# Patient Record
Sex: MALE | Race: WHITE | NOT HISPANIC OR LATINO | Employment: FULL TIME | URBAN - METROPOLITAN AREA
[De-identification: names, ages, dates, MRNs, and addresses within clinical notes are randomized per-mention and may not be internally consistent; named-entity substitution may affect disease eponyms.]

---

## 2017-06-21 ENCOUNTER — HISTORICAL (OUTPATIENT)
Dept: ADMINISTRATIVE | Facility: HOSPITAL | Age: 22
End: 2017-06-21

## 2017-06-27 ENCOUNTER — HISTORICAL (OUTPATIENT)
Dept: ADMINISTRATIVE | Facility: HOSPITAL | Age: 22
End: 2017-06-27

## 2017-06-27 LAB
BUN SERPL-MCNC: 18 MG/DL (ref 7–18)
CALCIUM SERPL-MCNC: 9.7 MG/DL (ref 8.5–10.1)
CHLORIDE SERPL-SCNC: 100 MMOL/L (ref 96–110)
CO2 SERPL-SCNC: 29 MMOL/L (ref 21–32)
CREAT SERPL-MCNC: 0.9 MG/DL (ref 0.6–1.3)
ERYTHROCYTE [DISTWIDTH] IN BLOOD BY AUTOMATED COUNT: 11.9 % (ref 11.5–14.5)
GLUCOSE SERPL-MCNC: 91 MG/DL (ref 74–106)
HCT VFR BLD AUTO: 48.7 % (ref 40–51)
HGB BLD-MCNC: 16.9 GM/DL (ref 13.5–17.5)
MCH RBC QN AUTO: 30.3 PG (ref 26–34)
MCHC RBC AUTO-ENTMCNC: 34.7 GM/DL (ref 31–37)
MCV RBC AUTO: 87.4 FL (ref 80–100)
PLATELET # BLD AUTO: 444 X10(3)/MCL (ref 130–400)
PMV BLD AUTO: 9 FL (ref 7.4–10.4)
POTASSIUM SERPL-SCNC: 3.8 MMOL/L (ref 3.6–5.2)
RBC # BLD AUTO: 5.57 X10(6)/MCL (ref 4.5–5.9)
SODIUM SERPL-SCNC: 140 MMOL/L (ref 135–146)
WBC # SPEC AUTO: 12.1 X10(3)/MCL (ref 4.5–11)

## 2017-06-28 ENCOUNTER — HISTORICAL (OUTPATIENT)
Dept: ADMINISTRATIVE | Facility: HOSPITAL | Age: 22
End: 2017-06-28

## 2017-07-06 ENCOUNTER — HISTORICAL (OUTPATIENT)
Dept: SURGERY | Facility: HOSPITAL | Age: 22
End: 2017-07-06

## 2017-07-19 ENCOUNTER — HISTORICAL (OUTPATIENT)
Dept: ADMINISTRATIVE | Facility: HOSPITAL | Age: 22
End: 2017-07-19

## 2017-08-02 ENCOUNTER — HISTORICAL (OUTPATIENT)
Dept: ADMINISTRATIVE | Facility: HOSPITAL | Age: 22
End: 2017-08-02

## 2017-09-06 ENCOUNTER — HISTORICAL (OUTPATIENT)
Dept: ADMINISTRATIVE | Facility: HOSPITAL | Age: 22
End: 2017-09-06

## 2018-02-08 ENCOUNTER — HISTORICAL (OUTPATIENT)
Dept: ADMINISTRATIVE | Facility: HOSPITAL | Age: 23
End: 2018-02-08

## 2018-03-06 ENCOUNTER — HISTORICAL (OUTPATIENT)
Dept: ADMINISTRATIVE | Facility: HOSPITAL | Age: 23
End: 2018-03-06

## 2018-11-14 ENCOUNTER — HISTORICAL (OUTPATIENT)
Dept: ADMINISTRATIVE | Facility: HOSPITAL | Age: 23
End: 2018-11-14

## 2019-03-26 ENCOUNTER — HISTORICAL (OUTPATIENT)
Dept: ADMINISTRATIVE | Facility: HOSPITAL | Age: 24
End: 2019-03-26

## 2019-08-25 ENCOUNTER — HISTORICAL (OUTPATIENT)
Dept: ADMINISTRATIVE | Facility: HOSPITAL | Age: 24
End: 2019-08-25

## 2019-11-26 ENCOUNTER — HISTORICAL (OUTPATIENT)
Dept: ADMINISTRATIVE | Facility: HOSPITAL | Age: 24
End: 2019-11-26

## 2021-02-10 ENCOUNTER — HISTORICAL (OUTPATIENT)
Dept: ADMINISTRATIVE | Facility: HOSPITAL | Age: 26
End: 2021-02-10

## 2021-06-14 ENCOUNTER — HISTORICAL (OUTPATIENT)
Dept: ADMINISTRATIVE | Facility: HOSPITAL | Age: 26
End: 2021-06-14

## 2021-12-28 ENCOUNTER — HISTORICAL (OUTPATIENT)
Dept: ADMINISTRATIVE | Facility: HOSPITAL | Age: 26
End: 2021-12-28

## 2022-04-11 ENCOUNTER — HISTORICAL (OUTPATIENT)
Dept: ADMINISTRATIVE | Facility: HOSPITAL | Age: 27
End: 2022-04-11

## 2022-04-27 VITALS
HEIGHT: 67 IN | BODY MASS INDEX: 48.44 KG/M2 | DIASTOLIC BLOOD PRESSURE: 103 MMHG | SYSTOLIC BLOOD PRESSURE: 146 MMHG | WEIGHT: 308.63 LBS

## 2022-04-30 NOTE — OP NOTE
DATE OF SURGERY:    07/06/2017    SURGEON:  Bernard Mustafa    ATTENDING PHYSICIAN:  Antonio Liao MD    PROCEDURE PERFORMED:  Open reduction and internal fixation of left bimalleolar ankle fracture.    PREOPERATIVE DIAGNOSIS:  Left bimalleolar ankle fracture.    POSTOPERATIVE DIAGNOSIS:  Left bimalleolar ankle fracture.    INDICATIONS FOR THE PROCEDURE:  This is a 22-year-old male who sustained a twisting injury to his left ankle a few weeks ago.  He had significant swelling and blisters, requiring a brief period of rest and immobilization, roughly 3 weeks, before presenting to the operating room today.  The risks, benefits and alternatives of operative fixation of his left ankle were discussed and he elected to proceed.    PROCEDURE IN DETAIL:  The patient was identified in the preoperative holding area.  The proper site was marked.  Consent was obtained.  He was taken to the operating room and placed supine on the operating table and a nonsterile tourniquet was applied after general endotracheal intubation anesthesia was administered.  Preoperative antibiotics were administered.  The left lower extremity was then prepped and draped in the usual sterile fashion.  The time-out was taken prior to the procedure start.  The tourniquet was inflated to 275mmHg.  A lateral incision over the fibula was made and dissection was carried down to the fibular fracture.  The fracture was then exposed fully and all callus was removed.  The fracture was then immobilized and reduced with a point to point clamp, and then fixated with a 2.7 lag screw.  We then applied a lateral distal fibula locking plate and confirmed proper position under fluoroscopy.  We then placed cortical screws proximally and locking screws distally.  Our attention was then turned to the medial malleolus.  A medial approach was made to the medial malleolus.  The fracture was then exposed and then cleaned out and then provisionally reduced with a point to  point clamp.  We then placed 2 K-wires perpendicular to the fracture and overdrilled and then placed 2 cannulated screws up into the fracture and these obtained a very good bite.  The fracture was well reduced.  We then confirmed all reductions and fixation on fluoroscopy.  At this point in time, we performed a cotton test on the fibula, which showed no A to P translation and then we obtained a mortise view and stressed the syndesmosis.  The syndesmosis was found to be stable.  At this point in time, all wounds were copiously irrigated.  The tourniquet was let down at a total of 120 minutes and hemostasis was achieved.  The wounds were then closed with 0 Vicryl deep, 2-0 Vicryl subcutaneously, and 2-0 nylon in the skin.  A sterile dressing was applied and then a posterior slab and stirrup were placed.  The drapes were then taken down and the procedure was deemed over at this time.  The patient was taken to the PACU extubated and in a stable condition.    IMPLANTS:  An Arthrex lateral distal fibula locking plate.  Two 4.0 cannulated screws in the medial malleolus.    PLAN:  The patient will be kept in his splint until his 2 week postoperative visit.  At this point in time the splint will be taken down and sutures will be removed.  We will keep him nonweight-bearing for this entire time, roughly for a period of 6 weeks.  At the 2 week point in time we will be able to transition him to a CAM boot and start working on range of motion.  At 6 weeks, as long as his x-rays at that time reveal healing of the fracture, we will begin to progress his weight-bearing.    COMPLICATIONS:  None.    ESTIMATED BLOOD LOSS:  15 mL.    STATEMENT OF STAFF PRESENCE:  Dr. Liao was present and scrubbed for all critical portions of the case.        ______________________________  Bernard CHERY/SKYLAR  DD:  07/06/2017  Time:  04:14PM  DT:  07/06/2017  Time:  04:30PM  Job #:  [id number]

## 2022-04-30 NOTE — H&P
* Final Report *   Document Contains Addenda  Chief Complaint LEFT ANKLE PAIN, C/O PAIN 8/10, F/U ON SKIN CHECK History of Present Illness 22 year old male here for skin check prior to surgery tomorrow for L closed displaced bimalleolar fracture repair 11 days ago. Was placed in leg cast 1 week ago in clinic, has been NWB. Fracture blisters were popped prior to casting. Denies any fever/chills or increased pain/swelling. Denies any paresthesias/numbness/tingling of LLE. Review of Systems Constitutional: No fever, No chills, No decreased activity.  Respiratory: Negative, No shortness of breath, No cough.  Cardiovascular: No chest pain, No palpitations, No syncope.  Gastrointestinal: No vomiting, No diarrhea.  Musculoskeletal: Left ankle pain 2/2 trauma, No back pain, No muscle pain.  Integumentary: No rash, No pruritus.  Neurologic: No numbness, No tingling, No headache. Physical Exam   Vitals & Measurements HT: 167 cm HT: 167 cm WT: 127 kg WT: 127 kg BMI: 45.54 General: Obese young male, alert, in no acute distress, afebrile  Head: NCAT, MMM  Neck: supple  CV: RRR, no murmurs  Lungs: CTAB, nonlabored  LLE: still moderate swelling around entire ankle and foot. Fracture blisters healing, however one fluid filled blister present around medial malleoli. 2+ DP. Unable to assess ROM due to pain/fracture. Assessment/Plan 1. Closed displaced bimalleolar fracture - here for skin check  - pt seen by Dr. Lee and Dr. Liao, still with too much edema/swelling for surgery, no skin wrinkle  - discussed with patient and patient's family who verbalized understanding  - will postpone surgery 1 week  - will repeat XR today    RTC in 1 week for skin check 7/5/17, plan for surgery 7/6/17 if swelling improved and no s/s infection Problem List/Past Medical History Morbid obesity Historical   No historical problems Procedure/Surgical History Tonsillectomy and adenoidectomy; younger than age 13. Medications AMOXICILLIN 500 MG CAPSULE,  500 mg, 1 cap(s), Oral, TID, Not taking BENZONATATE 200 MG CAPSULE, 200 mg, 1 cap(s), Oral, q6hr, Not taking ibuprofen 600 mg oral tablet, 800 Allergies No Known Allergies Social History   Alcohol   Current, Beer, 1-2 times per week, 12 drinks/episode average.   Never   Tobacco   Never smoker Family History DIABETES: Mother and Grandfather. Hypertension.: Mother and Grandmother.    Addendum by Antonio Liao MD on June 28, 2017 14:45:21 CDT (Verified)  aRmos's medical history, physical exam findings left ankle , diagnosis, and treatment outlined by the FM resident has been reviewed. Treatment plan with re reduction and re splinting is determined to be reasonable and appropriate. I was present during the evaluation. His swelling has improved but he needs more time and elevation. This was reviewed with him and his family.  Addendum by Efren Trujillo MD on July 06, 2017 06:10:02 CDT (Verified)  No interval change in HP from above dated. Swelling improved to adequate for surgery  Result type: Office/Clinic Note-Physician   Result date: June 28, 2017 13:03 CDT   Result status: Modified   Result title: [facility] clinic note   Performed by: Gricelda Shelley MD on June 28, 2017 13:05 CDT   Verified by: Gricelda Shelley MD on June 28, 2017 13:27 CDT   Encounter info: [id number], [facility], Clinic Visit, 6/28/2017 - 6/28/2017   Doc has been moved by HIM specialist.

## 2022-05-04 NOTE — HISTORICAL [facility] CERNER
This is a historical note converted from Neli. Formatting and pictures may have been removed.  Please reference Neli for original formatting and attached multimedia. Chief Complaint  post-op  History of Present Illness  2 weeks post op left bimalleolar ankle ORIF with stable syndesmosis  Compliant with instructions, no new complaints, pain controlled  Review of Systems  Negative  Physical Exam  Vitals & Measurements  HT:?167.74?cm? HT:?167.74?cm? WT:?127?kg? WT:?127?kg? BMI:?45.14?  LLE  Incisions healing well, sutures in place  No erythema  Non TTP  Mild appropriate swelling  ROM limited 2/2 pain  LTS intact  Motor intact  2+ DP  Assessment/Plan  1.?Bimalleolar fracture of left ankle  NWB LLE x 10 more weeks  CAM boot  Follow up 4 weeks  Ordered:  Clinic Follow up, *Est. 08/16/17 3:00:00 CDT, Order for future visit, Closed bimalleolar fracture of left ankle, [facility]  XR Ankle Left Minimum 3 Views, Routine, 07/19/17 14:34:00 CDT, Post-Op, None, Ambulatory, Rad Type, Order for future visit, Closed bimalleolar fracture of left ankle, 07/19/17 14:34:00 CDT  ?  Orders:  XR Ankle Left Minimum 3 Views, Routine, 07/19/17 14:30:00 CDT, Trauma, None, Ambulatory, Rad Type, 07/19/17 14:30:00 CDT   Problem List/Past Medical History  Morbid obesity  Historical  No historical problems  Procedure/Surgical History  Open treatment of bimalleolar ankle fracture (eg, lateral and medial malleoli, or lateral and posterior malleoli, or medial and posterior malleoli), includes internal fixation, when performed (07/06/2017), ORIF Ankle (Left) (07/06/2017), Reposition Left Fibula with Internal Fixation Device, Open Approach (07/06/2017), Tonsillectomy and adenoidectomy; younger than age 13.  Medications  Percocet 5/325 oral tablet, 1 tab(s), Oral, q4hr, PRN  Allergies  No Known Allergies  Social History  Alcohol  Current, Beer, 1-2 times per week, 12 drinks/episode average.  Never  Tobacco  Never smoker  Family  History  DIABETES: Mother and Grandfather.  Hypertension.: Mother and Grandmother.  Diagnostic Results  XR  No interval change in alignment, good fracture      ?? Trevors medical history, physical exam findings left ankle , diagnosis, and treatment outlined by the [facility]?resident has been reviewed.? Post operative treatment plan is determined to be reasonable and appropriate.?I was present during the evaluation. X-rays reviewed.

## 2022-05-04 NOTE — HISTORICAL [facility] CERNER
This is a historical note converted from Neli. Formatting and pictures may have been removed.  Please reference Neli for original formatting and attached multimedia. Chief Complaint  F/U Lt Ankle Fx.  History of Present Illness  Left yazan 8 weeks s/p ORIF  Doing well, has been placing some weight on the left ankle in boot, no pain, no new complaints  Review of Systems  Negative  Physical Exam  Vitals & Measurements  HT:?168.91?cm? HT:?168.91?cm? WT:?141.3?kg? WT:?141.3?kg? BMI:?49.53?  LLE  Incisions well healed  Mild swelling  Non TTP  Ankle ROM Df 10 Pf 30  NVI  Assessment/Plan  1.?Closed bimalleolar fracture of left ankle  ?TTWB in CAM boot, with crutches  ?Will assess comparison films of syndesmosis at next visit to see if stable and compare to contralateral uninjured side  Ordered:  Clinic Follow up  Post-Op follow-up visit [id number] PC  XR Ankle Left Minimum 3 Views  XR Ankle Right Minimum 3 Views  ?   Problem List/Past Medical History  Ongoing  Morbid obesity  Historical  Procedure/Surgical History  Open treatment of bimalleolar ankle fracture (eg, lateral and medial malleoli, or lateral and posterior malleoli, or medial and posterior malleoli), includes internal fixation, when performed (07/06/2017)  ORIF Ankle (Left) (07/06/2017)  Reposition Left Fibula with Internal Fixation Device, Open Approach (07/06/2017)  Tonsillectomy and adenoidectomy; younger than age 13  Medications  HYDROCODON-ACETAMINOPH 7.5-325, Oral,? ?Not taking  OXYCODONE-ACETAMINOPHEN 5-325, 1 tab(s), Oral, q4hr,? ?Not taking  Allergies  No Known Allergies  Social History  Alcohol - 08/02/2017  Current, Beer, 1-2 times per week, 12 drinks/episode average.  Never  Substance Abuse - 08/02/2017  Never  Tobacco - 08/02/2017  Never smoker  Family History  DIABETES: Mother and Grandfather.  Hypertension.: Mother and Grandmother.  Diagnostic Results  No interval change in fracture alignment no hardware failure, healing of fractures  noted  ?  Interval change in alignment of the syndesmosis, with mild widening of the tib-fib space      Patients medical history, physical exam findings, diagnosis and treatment outlines by the resident has been reviewed. Treatment plan is determined to be reasonable and appropriate. I was present during the evaluation.

## 2022-05-04 NOTE — HISTORICAL [facility] CERNER
This is a historical note converted from Neli. Formatting and pictures may have been removed.  Please reference Neli for original formatting and attached multimedia. Chief Complaint  F/U visit: L mp ankle fx---ORIF 7/6/17  History of Present Illness  4 weeks s/p L Mp ORIF, doing well, compliant with instructions, no new complaints  Review of Systems  Neg  Physical Exam  Vitals & Measurements  HT:?168.91?cm? HT:?168.91?cm? WT:?130.2?kg? WT:?130.2?kg? BMI:?45.64?  LLE  Incisions well healed  Appropriate swelling and minimal TTP  ROM limited by stiffness  LTS/Motor intact  2+ DP  Assessment/Plan  Closed bimalleolar fracture of left ankle  ?NWB LLe  Cam boot  Follow up 4 weeks with repeat XR  Ordered:  Clinic Follow up, *Est. 08/30/17 3:00:00 CDT, Order for future visit, Closed bimalleolar fracture of left ankle, [facility]  ?   Problem List/Past Medical History  Morbid obesity  Historical  No historical problems  Procedure/Surgical History  Open treatment of bimalleolar ankle fracture (eg, lateral and medial malleoli, or lateral and posterior malleoli, or medial and posterior malleoli), includes internal fixation, when performed (07/06/2017), ORIF Ankle (Left) (07/06/2017), Reposition Left Fibula with Internal Fixation Device, Open Approach (07/06/2017), Tonsillectomy and adenoidectomy; younger than age 13.  Medications  HYDROCODON-ACETAMINOPH 7.5-325, Oral  OXYCODONE-ACETAMINOPHEN 5-325, 1 tab(s), Oral, q4hr  Allergies  No Known Allergies  Social History  Alcohol  Current, Beer, 1-2 times per week, 12 drinks/episode average.  Never  Substance Abuse  Never  Tobacco  Never smoker  Family History  DIABETES: Mother and Grandfather.  Hypertension.: Mother and Grandmother.  Diagnostic Results  No interval change in alignment      ?? Trevors medical history, physical exam findings left ankle , diagnosis, and treatment outlined by the [facility]?resident has been reviewed.? Post op treatment plan is determined to be  reasonable and appropriate.?I was present during the evaluation. X-rays reviewed.

## 2022-05-04 NOTE — HISTORICAL [facility] CERNER
This is a historical note converted from Neli. Formatting and pictures may have been removed.  Please reference Neli for original formatting and attached multimedia. Chief Complaint  LEFT ANKLE PAIN, C/O PAIN 8/10, F/U ON SKIN CHECK  History of Present Illness  22 year old male here for skin check prior to surgery tomorrow for?L closed displaced?bimalleolar fracture repair 11 days ago. Was placed in leg cast 1 week ago in clinic, has been NWB. Fracture blisters were popped prior to casting. Denies any fever/chills or increased pain/swelling. Denies any paresthesias/numbness/tingling of LLE.  Review of Systems  Constitutional: No fever, No chills, No decreased activity.  ?????Respiratory: Negative, No shortness of breath, No cough.  ?????Cardiovascular: No chest pain, No palpitations, No syncope.  ?????Gastrointestinal: No vomiting, No diarrhea.  ?????Musculoskeletal: Left ankle pain 2/2 trauma, No back pain, No muscle pain.  ?????Integumentary: No rash, No pruritus.  ?????Neurologic: No numbness, No tingling, No headache.  Physical Exam  Vitals & Measurements  HT:?167?cm? HT:?167?cm? WT:?127?kg? WT:?127?kg? BMI:?45.54?  ??General: Obese young male, alert, in no acute distress, afebrile  ?????Head: NCAT, MMM  ???? Neck: supple  ???? CV: RRR, no murmurs  ???? Lungs:?CTAB, nonlabored  ???? LLE:?still?moderate swelling around entire ankle and foot.?Fracture blisters healing, however one?fluid filled blister present around medial malleoli. 2+ DP. Unable to assess?ROM?due to pain/fracture.  Assessment/Plan  1.?Closed displaced bimalleolar fracture  ?- here for skin check  ?- pt seen by Dr. Lee and Dr. Liao, still with too much edema/swelling for surgery, no skin wrinkle  ?- discussed with patient and patients family who verbalized understanding  ?- will postpone surgery 1 week  ?- will repeat XR today  ?  RTC in 1 week for skin check 7/5/17, plan for surgery 7/6/17 if swelling improved and no s/s infection    Problem List/Past Medical History  Morbid obesity  Historical  No historical problems  Procedure/Surgical History  Tonsillectomy and adenoidectomy; younger than age 13.  Medications  AMOXICILLIN 500 MG CAPSULE, 500 mg, 1 cap(s), Oral, TID,? ?Not taking  BENZONATATE 200 MG CAPSULE, 200 mg, 1 cap(s), Oral, q6hr,? ?Not taking  ibuprofen 600 mg oral tablet, 800  Allergies  No Known Allergies  Social History  Alcohol  Current, Beer, 1-2 times per week, 12 drinks/episode average.  Never  Tobacco  Never smoker  Family History  DIABETES: Mother and Grandfather.  Hypertension.: Mother and Grandmother.      ?? Trevors medical history, physical exam findings left ankle , diagnosis, and treatment outlined by the FM?resident has been reviewed.? Treatment plan with re reduction and?re splinting ?is determined to be reasonable and appropriate.?I was present during the evaluation. His swelling has improved but he needs more time and elevation. This was reviewed with him and his family.   No interval change in HP from above dated. Swelling improved to adequate for surgery

## 2022-05-04 NOTE — HISTORICAL [facility] CERNER
This is a historical note converted from Neli. Formatting and pictures may have been removed.  Please reference Neli for original formatting and attached multimedia. Chief Complaint  LEFT ANKLE FRACTURE, C/O PAIN 10/10  History of Present Illness  22 year old male referred to ortho clinic for left?ankle fracture 4 days ago (6/17/17)?after falling off a boat and landing on left foot.?Went to ER same day of incident and was placed in short leg splint. XR at that time showing trimalleolar fracture. Complaining of 10/10 pain today. Has been elevating foot occasionally to reduce swelling. Reports he cannot feel his left pinky toe. Pt is a non-smoker.  Review of Systems  ????Constitutional: No fever, No chills, No decreased activity.  ????Respiratory: Negative, No shortness of breath, No cough.  ????Cardiovascular: No chest pain, No palpitations, No syncope.  ????Gastrointestinal: No vomiting, No diarrhea.  ????Musculoskeletal: Left ankle pain 2/2 trauma, No back pain, No muscle pain.  ????Integumentary: No rash, No pruritus.  ????Neurologic: No numbness, No tingling, No headache.  Physical Exam  Vitals & Measurements  HR:?78?(Peripheral)? BP:?136/80? HT:?167?cm? HT:?167?cm? WT:?127?kg? WT:?127?kg? BMI:?45.54?  ???General: Obese young male, alert, in no acute distress, afebrile  ?????Head: NCAT, MMM  ???? Neck: supple  ???? CV: RRR, no murmurs  ???? Lungs:?CTAB, nonlabored  ???? LLE: Moderate swelling and?ecchymosis around entire ankle. Several fracture blisters present. No open wounds. 1+ DP pulse, diminished due to swelling. Pin prick sensation diminished over  ?????left?5th metatarsal.?Unable to assess?ROM?due to pain/fracture.  Assessment/Plan  1.?Closed displaced bimalleolar fracture, left ankle  ??-?XR reviewed with Dr. Liao and Dr. Lee,?surgery recommended  ??- plan for skin check on 6/28/17 and surgery on 6/29/17 as long as?no s/s of skin infection  ? - placed in leg cast for immobilization  ? - discussed  importance of?ice/elevation to reduce swelling  ? - stressed?no weight bearing until then  ? - Rx Norco 10 #30 given?as needed for pain relief  ?  RTC 6/28/17 for skin check  Orders:  XR Ankle Left Minimum 3 Views, Routine, 06/21/17 8:41:00 CDT, Pain, None, Ambulatory, Rad Type, Left trimalleolar fracture, 06/21/17 8:41:00 CDT   Problem List/Past Medical History  No chronic problems  Historical  No historical problems  Medications  No active medications  Allergies  No active allergies      ? Trevors?medical history, physical exam findings left ankle , diagnosis, and treatment outlined by the FM?resident has been reviewed.? Treatment plan is determined to be reasonable and appropriate.?I was present during the evaluation. Splint is applied with an attempt to improve the position and decrease the swelling. His fracture blisters were drained and dressed. Elevation was discussed with him and his family.

## 2022-05-16 ENCOUNTER — HISTORICAL (OUTPATIENT)
Dept: ADMINISTRATIVE | Facility: HOSPITAL | Age: 27
End: 2022-05-16

## 2022-10-27 ENCOUNTER — HISTORICAL (OUTPATIENT)
Dept: ADMINISTRATIVE | Facility: HOSPITAL | Age: 27
End: 2022-10-27

## 2023-02-06 ENCOUNTER — HOSPITAL ENCOUNTER (EMERGENCY)
Facility: HOSPITAL | Age: 28
Discharge: HOME | End: 2023-02-06

## 2023-02-06 VITALS
TEMPERATURE: 97 F | OXYGEN SATURATION: 100 % | HEART RATE: 68 BPM | DIASTOLIC BLOOD PRESSURE: 96 MMHG | BODY MASS INDEX: 50.62 KG/M2 | RESPIRATION RATE: 19 BRPM | HEIGHT: 66 IN | SYSTOLIC BLOOD PRESSURE: 137 MMHG | WEIGHT: 315 LBS

## 2023-02-06 DIAGNOSIS — R05.3 CHRONIC COUGH: Primary | ICD-10-CM

## 2023-02-06 LAB
ALBUMIN SERPL-MCNC: 3.8 G/DL (ref 3.4–5)
ALBUMIN/GLOB SERPL: 1.4 RATIO
ALP SERPL-CCNC: 56 UNIT/L (ref 50–144)
ALT SERPL-CCNC: 24 UNIT/L (ref 1–45)
ANION GAP SERPL CALC-SCNC: 4 MEQ/L (ref 2–13)
AST SERPL-CCNC: 28 UNIT/L (ref 17–59)
BASOPHILS # BLD AUTO: 0.02 X10(3)/MCL (ref 0.01–0.08)
BASOPHILS NFR BLD AUTO: 0.2 % (ref 0.1–1.2)
BILIRUBIN DIRECT+TOT PNL SERPL-MCNC: 0.4 MG/DL (ref 0–1)
BNP BLD-MCNC: 257 PG/ML (ref 10–450)
BUN SERPL-MCNC: 13 MG/DL (ref 7–20)
CALCIUM SERPL-MCNC: 8 MG/DL (ref 8.4–10.2)
CHLORIDE SERPL-SCNC: 102 MMOL/L (ref 98–110)
CO2 SERPL-SCNC: 35 MMOL/L (ref 21–32)
CREAT SERPL-MCNC: 0.79 MG/DL (ref 0.66–1.25)
CREAT/UREA NIT SERPL: 16 (ref 12–20)
EOSINOPHIL # BLD AUTO: 0.37 X10(3)/MCL (ref 0.04–0.54)
EOSINOPHIL NFR BLD AUTO: 4.5 % (ref 0.7–7)
ERYTHROCYTE [DISTWIDTH] IN BLOOD BY AUTOMATED COUNT: 13.3 % (ref 11.6–14.4)
GFR SERPLBLD CREATININE-BSD FMLA CKD-EPI: >90 MLS/MIN/1.73/M2
GLOBULIN SER-MCNC: 2.7 GM/DL (ref 2–3.9)
GLUCOSE SERPL-MCNC: 93 MG/DL (ref 70–115)
HCT VFR BLD AUTO: 40.5 % (ref 36–52)
HGB BLD-MCNC: 13.2 GM/DL (ref 13–18)
IMM GRANULOCYTES # BLD AUTO: 0.03 X10(3)/MCL (ref 0–0.03)
IMM GRANULOCYTES NFR BLD AUTO: 0.4 % (ref 0–0.5)
LYMPHOCYTES # BLD AUTO: 1.08 X10(3)/MCL (ref 1.32–3.57)
LYMPHOCYTES NFR BLD AUTO: 13.3 % (ref 20–55)
MCH RBC QN AUTO: 28.1 PG (ref 27–34)
MCV RBC AUTO: 86.2 FL (ref 79–99)
MEAN CELL HEMOGLOBIN CONCENTRATION (OHS) G/DL: 32.6 G/DL (ref 31–37)
MONOCYTES # BLD AUTO: 0.74 X10(3)/MCL (ref 0.3–0.82)
MONOCYTES NFR BLD AUTO: 9.1 % (ref 4.7–12.5)
NEUTROPHILS # BLD AUTO: 5.91 X10(3)/MCL (ref 1.78–5.38)
NEUTROPHILS NFR BLD AUTO: 72.5 % (ref 37–73)
NRBC BLD AUTO-RTO: 0 % (ref 0–1)
PLATELET # BLD AUTO: 326 X10(3)/MCL (ref 140–371)
PMV BLD AUTO: 9.3 FL (ref 9.4–12.4)
POTASSIUM SERPL-SCNC: 3.6 MMOL/L (ref 3.5–5.1)
PROT SERPL-MCNC: 6.5 GM/DL (ref 6.3–8.2)
RBC # BLD AUTO: 4.7 X10(6)/MCL (ref 4–6)
SODIUM SERPL-SCNC: 141 MMOL/L (ref 135–145)
WBC # SPEC AUTO: 8.2 X10(3)/MCL (ref 4–11.5)

## 2023-02-06 PROCEDURE — 80053 COMPREHEN METABOLIC PANEL: CPT | Performed by: NURSE PRACTITIONER

## 2023-02-06 PROCEDURE — 83880 ASSAY OF NATRIURETIC PEPTIDE: CPT | Performed by: NURSE PRACTITIONER

## 2023-02-06 PROCEDURE — 99284 EMERGENCY DEPT VISIT MOD MDM: CPT | Mod: 25

## 2023-02-06 PROCEDURE — 85025 COMPLETE CBC W/AUTO DIFF WBC: CPT | Performed by: NURSE PRACTITIONER

## 2023-02-06 PROCEDURE — 36415 COLL VENOUS BLD VENIPUNCTURE: CPT | Performed by: NURSE PRACTITIONER

## 2023-02-06 PROCEDURE — 25000003 PHARM REV CODE 250: Performed by: NURSE PRACTITIONER

## 2023-02-06 RX ORDER — BENZONATATE 100 MG/1
100 CAPSULE ORAL 3 TIMES DAILY PRN
Qty: 20 CAPSULE | Refills: 0 | OUTPATIENT
Start: 2023-02-06 | End: 2023-02-13

## 2023-02-06 RX ORDER — CALCIUM CARBONATE 200(500)MG
1000 TABLET,CHEWABLE ORAL DAILY
Status: COMPLETED | OUTPATIENT
Start: 2023-02-06 | End: 2023-02-06

## 2023-02-06 RX ORDER — CLONIDINE HYDROCHLORIDE 0.1 MG/1
0.1 TABLET ORAL
Status: COMPLETED | OUTPATIENT
Start: 2023-02-06 | End: 2023-02-06

## 2023-02-06 RX ADMIN — CALCIUM CARBONATE (ANTACID) CHEW TAB 500 MG 1000 MG: 500 CHEW TAB at 06:02

## 2023-02-06 RX ADMIN — CLONIDINE HYDROCHLORIDE 0.1 MG: 0.1 TABLET ORAL at 05:02

## 2023-02-06 NOTE — ED PROVIDER NOTES
Encounter Date: 2/6/2023       History     Chief Complaint   Patient presents with    Cough    Abdominal Pain     COUGHING AND BLOOD TINGED MUCOUS X2 MONTHS. STATES HE HAS BEEN SEEN IN THE ED AND WAS REFERRED TO PMD AND TO SCHEDULE A PULMONARY FUNCTION STUDY BUT HAS NOT DUE TO COST.      C/o cough with blood streaked sputum x 2 months intermittent. 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 No pertinent family history.  Social History     Tobacco Use    Smoking status: Never    Smokeless tobacco: Never   Substance Use Topics    Alcohol use: Yes     Alcohol/week: 24.0 standard drinks     Types: 24 Cans of beer per week     Comment: ON WEEKENDS    Drug use: Never     Review of Systems   Respiratory:  Positive for cough.         Hepotysis   All other systems reviewed and are negative.    Physical Exam     Initial Vitals [02/06/23 1625]   BP Pulse Resp Temp SpO2   (!) 179/98 94 20 97 °F (36.1 °C) 97 %      MAP       --         Physical Exam    Nursing note and vitals reviewed.  Constitutional: He appears well-developed.   obese   HENT:   Head: Normocephalic and atraumatic.   Mouth/Throat: Mucous membranes are normal.   Eyes: EOM are normal. Pupils are equal, round, and reactive to light.   Neck: Neck supple.   Normal range of motion.  Cardiovascular:  Normal rate, regular rhythm and normal heart sounds.           Pulmonary/Chest: Breath sounds normal.   Musculoskeletal:         General: Normal range of motion.      Cervical back: Normal range of motion and neck supple.     Neurological: He is alert and oriented to person, place, and time. He has normal strength.   Skin: Skin is warm and dry. Capillary refill  takes less than 2 seconds.   Psychiatric: He has a normal mood and affect. His behavior is normal. Judgment and thought content normal.       ED Course   Procedures  Labs Reviewed   COMPREHENSIVE METABOLIC PANEL - Abnormal; Notable for the following components:       Result Value    Carbon Dioxide 35 (*)     Calcium Level Total 8.0 (*)     All other components within normal limits   CBC WITH DIFFERENTIAL - Abnormal; Notable for the following components:    MPV 9.3 (*)     Lymph % 13.3 (*)     Lymph # 1.08 (*)     Neut # 5.91 (*)     All other components within normal limits   NT-PRO NATRIURETIC PEPTIDE - Normal   CBC W/ AUTO DIFFERENTIAL    Narrative:     The following orders were created for panel order CBC auto differential.  Procedure                               Abnormality         Status                     ---------                               -----------         ------                     CBC with Differential[[id number]]        Abnormal            Final result                 Please view results for these tests on the individual orders.          Imaging Results              CT Chest Without Contrast (Final result)  Result time 02/06/23 17:20:59      Final result by Paulette Mcguire III, MD (02/06/23 17:20:59)                   Impression:      1. No clinically significant abnormalities are noted.      Electronically signed by: Paulette Mcguire  Date:    02/06/2023  Time:    17:20               Narrative:    EXAMINATION:  STUDY:CT CHEST WITHOUT CONTRAST    CLINICAL HISTORY AND TECHNIQUE:  RT Jose M on 2/6/2023  5:16 PM    PATIENT STATUS : ER PT    PROCEDURE: CT CHEST WO CONT    CLINICAL HX:    X 2 MONTHS    - C/O COUGHING TIL COUGHING UP BLOOD    PMH: HTN    IV CONTRAST: NONE    ORAL CONTRAST: NONE    RECTAL CONTRAST:NONE    AXIAL IMAGES @ 5MM INTERVALS WITH MULTI PLANAR RECONSTRUCTION OF IMAGES    TOTAL IMAGE NUMBER : 142    NUMBER OF CT SCANS LAST 12 MONTHS : 2    CTDIvol(mGy) : HEAD:       BODY:  24.00    DLP (mGycm) : HEAD :      BODY: 953.10    TECH : DB    PT TRANSPORTED W/O INCIDENT    This patient has had 2 CT and nuclear medicine scans performed within the last 12 months.    The following DOSE REDUCTION TECHNIQUES are used for all CT scans at [facility]:    1. Automated exposure control.  2. Adjustment of the mA and/or kv according to patient size.  3. Use of iterative reconstruction technique.    COMPARISON:  None    FINDINGS:  Lungs: The lungs are slightly under expanded with no worrisome parenchymal masses/nodules or focal consolidation.    Airways: The airways are fairly well delineated and appear unremarkable with no definite endobronchial lesions noted.    Mediastinum and hilar regions:No worrisome hilar mediastinal masses or matted lymphadenopathy are appreciated.    Vascular structures: No clinically significant abnormalities noted.    Musculoskeletal structures: No clinically significant abnormalities noted.    Miscellaneous: N/A                                       Medications   cloNIDine tablet 0.1 mg (0.1 mg Oral Given 2/6/23 1734)   calcium carbonate 200 mg calcium (500 mg) chewable tablet 1,000 mg (1,000 mg Oral Given 2/6/23 1842)                              Clinical Impression:   Final diagnoses:  [R05.3] Chronic cough (Primary)        ED Disposition Condition    Discharge Stable          ED Prescriptions       Medication Sig Dispense Start Date End Date Auth. Provider    benzonatate (TESSALON) 100 MG capsule Take 1 capsule (100 mg total) by mouth 3 (three) times daily as needed for Cough. 20 capsule 2/6/2023 2/16/2023 VALERIE Flores          Follow-up Information       Follow up With Specialties Details Why Contact Info    [facility] Schedule an appointment as soon as possible for a visit  asap [address]  [address]  Family Medicine Clinic  [facility] [address]  [phone]               VALERIE Flores  02/06/23 2951

## 2023-02-06 NOTE — Clinical Note
"Ramos Knight (Trevor) was seen and treated in our emergency department on 2/6/2023.  He may return to work on 02/08/2023.       If you have any questions or concerns, please don't hesitate to call.      Liliane LOPEZ"

## 2023-02-13 ENCOUNTER — HOSPITAL ENCOUNTER (EMERGENCY)
Facility: HOSPITAL | Age: 28
Discharge: HOME | End: 2023-02-13

## 2023-02-13 VITALS
OXYGEN SATURATION: 99 % | DIASTOLIC BLOOD PRESSURE: 97 MMHG | SYSTOLIC BLOOD PRESSURE: 165 MMHG | TEMPERATURE: 98 F | HEART RATE: 93 BPM | HEIGHT: 66 IN | WEIGHT: 315 LBS | BODY MASS INDEX: 50.62 KG/M2 | RESPIRATION RATE: 20 BRPM

## 2023-02-13 DIAGNOSIS — L03.90 CELLULITIS: ICD-10-CM

## 2023-02-13 DIAGNOSIS — I10 HYPERTENSION, UNSPECIFIED TYPE: ICD-10-CM

## 2023-02-13 DIAGNOSIS — E66.01 CLASS 3 SEVERE OBESITY DUE TO EXCESS CALORIES WITHOUT SERIOUS COMORBIDITY WITH BODY MASS INDEX (BMI) OF 60.0 TO 69.9 IN ADULT: ICD-10-CM

## 2023-02-13 DIAGNOSIS — R60.0 PERIPHERAL EDEMA: Primary | ICD-10-CM

## 2023-02-13 DIAGNOSIS — R60.9 EDEMA: ICD-10-CM

## 2023-02-13 DIAGNOSIS — R05.9 COUGH: ICD-10-CM

## 2023-02-13 PROBLEM — E66.813 CLASS 3 SEVERE OBESITY DUE TO EXCESS CALORIES WITHOUT SERIOUS COMORBIDITY WITH BODY MASS INDEX (BMI) OF 60.0 TO 69.9 IN ADULT: Status: ACTIVE | Noted: 2023-02-13

## 2023-02-13 LAB
ALBUMIN SERPL-MCNC: 3.8 G/DL (ref 3.4–5)
ALBUMIN/GLOB SERPL: 1.2 RATIO
ALP SERPL-CCNC: 59 UNIT/L (ref 50–144)
ALT SERPL-CCNC: 31 UNIT/L (ref 1–45)
ANION GAP SERPL CALC-SCNC: 6 MEQ/L (ref 2–13)
AST SERPL-CCNC: 31 UNIT/L (ref 17–59)
BASOPHILS # BLD AUTO: 0.05 X10(3)/MCL (ref 0.01–0.08)
BASOPHILS NFR BLD AUTO: 0.4 % (ref 0.1–1.2)
BILIRUBIN DIRECT+TOT PNL SERPL-MCNC: 0.3 MG/DL (ref 0–1)
BNP BLD-MCNC: 273 PG/ML (ref 10–450)
BUN SERPL-MCNC: 10 MG/DL (ref 7–20)
CALCIUM SERPL-MCNC: 8.7 MG/DL (ref 8.4–10.2)
CHLORIDE SERPL-SCNC: 103 MMOL/L (ref 98–110)
CO2 SERPL-SCNC: 31 MMOL/L (ref 21–32)
CREAT SERPL-MCNC: 0.68 MG/DL (ref 0.66–1.25)
CREAT/UREA NIT SERPL: 15 (ref 12–20)
EOSINOPHIL # BLD AUTO: 0.11 X10(3)/MCL (ref 0.04–0.54)
EOSINOPHIL NFR BLD AUTO: 0.8 % (ref 0.7–7)
ERYTHROCYTE [DISTWIDTH] IN BLOOD BY AUTOMATED COUNT: 13.2 % (ref 11.6–14.4)
GFR SERPLBLD CREATININE-BSD FMLA CKD-EPI: >90 MLS/MIN/1.73/M2
GLOBULIN SER-MCNC: 3.2 GM/DL (ref 2–3.9)
GLUCOSE SERPL-MCNC: 100 MG/DL (ref 70–115)
HCT VFR BLD AUTO: 42.3 % (ref 36–52)
HGB BLD-MCNC: 13.6 GM/DL (ref 13–18)
IMM GRANULOCYTES # BLD AUTO: 0.04 X10(3)/MCL (ref 0–0.03)
IMM GRANULOCYTES NFR BLD AUTO: 0.3 % (ref 0–0.5)
LYMPHOCYTES # BLD AUTO: 1.11 X10(3)/MCL (ref 1.32–3.57)
LYMPHOCYTES NFR BLD AUTO: 8.1 % (ref 20–55)
MCH RBC QN AUTO: 27.5 PG (ref 27–34)
MCV RBC AUTO: 85.5 FL (ref 79–99)
MEAN CELL HEMOGLOBIN CONCENTRATION (OHS) G/DL: 32.2 G/DL (ref 31–37)
MONOCYTES # BLD AUTO: 0.84 X10(3)/MCL (ref 0.3–0.82)
MONOCYTES NFR BLD AUTO: 6.1 % (ref 4.7–12.5)
NEUTROPHILS # BLD AUTO: 11.54 X10(3)/MCL (ref 1.78–5.38)
NEUTROPHILS NFR BLD AUTO: 84.3 % (ref 37–73)
NRBC BLD AUTO-RTO: 0 % (ref 0–1)
PLATELET # BLD AUTO: 322 X10(3)/MCL (ref 140–371)
PMV BLD AUTO: 9.1 FL (ref 9.4–12.4)
POTASSIUM SERPL-SCNC: 3.7 MMOL/L (ref 3.5–5.1)
PROT SERPL-MCNC: 7 GM/DL (ref 6.3–8.2)
RBC # BLD AUTO: 4.95 X10(6)/MCL (ref 4–6)
SODIUM SERPL-SCNC: 140 MMOL/L (ref 135–145)
WBC # SPEC AUTO: 13.7 X10(3)/MCL (ref 4–11.5)

## 2023-02-13 PROCEDURE — 99285 EMERGENCY DEPT VISIT HI MDM: CPT | Mod: 25

## 2023-02-13 PROCEDURE — 25000003 PHARM REV CODE 250

## 2023-02-13 PROCEDURE — 93010 ELECTROCARDIOGRAM REPORT: CPT | Mod: ,,, | Performed by: INTERNAL MEDICINE

## 2023-02-13 PROCEDURE — 93010 EKG 12-LEAD: ICD-10-PCS | Mod: ,,, | Performed by: INTERNAL MEDICINE

## 2023-02-13 PROCEDURE — 80053 COMPREHEN METABOLIC PANEL: CPT

## 2023-02-13 PROCEDURE — 85025 COMPLETE CBC W/AUTO DIFF WBC: CPT

## 2023-02-13 PROCEDURE — 36415 COLL VENOUS BLD VENIPUNCTURE: CPT

## 2023-02-13 PROCEDURE — 83880 ASSAY OF NATRIURETIC PEPTIDE: CPT

## 2023-02-13 PROCEDURE — 93005 ELECTROCARDIOGRAM TRACING: CPT

## 2023-02-13 RX ORDER — LOSARTAN POTASSIUM 50 MG/1
50 TABLET ORAL EVERY MORNING
COMMUNITY
Start: 2022-10-27 | End: 2023-02-13 | Stop reason: DRUGHIGH

## 2023-02-13 RX ORDER — BENZONATATE 200 MG/1
200 CAPSULE ORAL 3 TIMES DAILY PRN
Qty: 60 CAPSULE | Refills: 0 | Status: SHIPPED | OUTPATIENT
Start: 2023-02-13 | End: 2023-02-23

## 2023-02-13 RX ORDER — CLINDAMYCIN PHOSPHATE 900 MG/50ML
900 INJECTION, SOLUTION INTRAVENOUS
Status: DISCONTINUED | OUTPATIENT
Start: 2023-02-13 | End: 2023-02-13

## 2023-02-13 RX ORDER — ALBUTEROL SULFATE 90 UG/1
2 AEROSOL, METERED RESPIRATORY (INHALATION) EVERY 6 HOURS PRN
COMMUNITY
Start: 2022-10-28 | End: 2024-01-23

## 2023-02-13 RX ORDER — AMLODIPINE BESYLATE 5 MG/1
5 TABLET ORAL
COMMUNITY
Start: 2022-12-19 | End: 2023-07-26

## 2023-02-13 RX ORDER — LOSARTAN POTASSIUM AND HYDROCHLOROTHIAZIDE 12.5; 5 MG/1; MG/1
1 TABLET ORAL DAILY
Qty: 90 TABLET | Refills: 3 | Status: SHIPPED | OUTPATIENT
Start: 2023-02-13 | End: 2023-07-26 | Stop reason: ALTCHOICE

## 2023-02-13 RX ORDER — BENZONATATE 100 MG/1
200 CAPSULE ORAL ONCE
Status: COMPLETED | OUTPATIENT
Start: 2023-02-13 | End: 2023-02-13

## 2023-02-13 RX ADMIN — BENZONATATE 200 MG: 100 CAPSULE ORAL at 06:02

## 2023-02-13 NOTE — ED PROVIDER NOTES
Encounter Date: 2/13/2023       History     Chief Complaint   Patient presents with    Cellulitis     SENT HERE BY URGENT CARE FOR CELLULITIS OF BLE. REDNESS AND SWELLING NOTED. HAS HTN BUT DOES NOT TAKE MEDICATION FOR IT.     27-year-old male presents complaining of lower extremity edema for the past couple of days.  He also has a cough.      Review of patient's allergies indicates:   Allergen Reactions    Lisinopril      Past Medical History:   Diagnosis Date    Broken ankle     Hypertension      Past Surgical History:   Procedure Laterality Date    ANKLE SURGERY       History reviewed. No pertinent family history.  Social History     Tobacco Use    Smoking status: Never    Smokeless tobacco: Never   Substance Use Topics    Alcohol use: Yes     Alcohol/week: 24.0 standard drinks     Types: 24 Cans of beer per week     Comment: ON WEEKENDS    Drug use: Never     Review of Systems   Cardiovascular:         Bilateral lower extremity edema.   All other systems reviewed and are negative.    Physical Exam     Initial Vitals [02/13/23 1725]   BP Pulse Resp Temp SpO2   (!) 199/116 99 20 98.3 °F (36.8 °C) 95 %      MAP       --         Physical Exam    Nursing note and vitals reviewed.  Constitutional:   Patient is morbidly obese.   HENT:   Head: Normocephalic and atraumatic.   Eyes: Pupils are equal, round, and reactive to light.   Neck: Neck supple.   Normal range of motion.  Cardiovascular:  Normal rate.           Pulmonary/Chest: Breath sounds normal.   Abdominal: Abdomen is soft. Bowel sounds are normal.   Musculoskeletal:         General: Edema present.      Cervical back: Normal range of motion and neck supple.     Skin: Skin is warm and dry. No rash noted. No erythema.   Psychiatric: He has a normal mood and affect. His behavior is normal. Judgment and thought content normal.       ED Course   Procedures  Labs Reviewed   CBC WITH DIFFERENTIAL - Abnormal; Notable for the following components:       Result Value     WBC 13.7 (*)     MPV 9.1 (*)     Neut % 84.3 (*)     Lymph % 8.1 (*)     Lymph # 1.11 (*)     Neut # 11.54 (*)     Mono # 0.84 (*)     IG# 0.04 (*)     All other components within normal limits   NT-PRO NATRIURETIC PEPTIDE - Normal   CBC W/ AUTO DIFFERENTIAL    Narrative:     The following orders were created for panel order CBC auto differential.  Procedure                               Abnormality         Status                     ---------                               -----------         ------                     CBC with Differential[[id number]]        Abnormal            Final result                 Please view results for these tests on the individual orders.   COMPREHENSIVE METABOLIC PANEL     EKG Readings: (Independently Interpreted)   Rhythm: Normal Sinus Rhythm. Heart Rate: 94. Ectopy: No Ectopy. Conduction: Normal. ST Segments: Normal ST Segments. T Waves: Normal. Clinical Impression: Normal Sinus Rhythm     Imaging Results              X-Ray Chest PA And Lateral (Final result)  Result time 02/13/23 19:31:02      Final result by Paulette Mcguire III, MD (02/13/23 19:31:02)                   Impression:      1. The heart is mildly enlarged with perihilar stranding extending peripherally and exaggerated by the patient's body habitus and poor inspiratory effort.Diagnostic possibilities include changes of interstitial edema (unusual in a patient this age) versus changes related to bronchiolitis/bronchitis or even an early interstitial pneumonia, and clinical correlation is recommended.      Electronically signed by: Paulette Mcguire  Date:    02/13/2023  Time:    19:31               Narrative:    EXAMINATION:  STUDY: XR CHEST PA AND LATERAL    CLINICAL HISTORY AND TECHNIQUE:  RT Rachel on 2/13/2023  6:52 PM    CURRENT CLINICAL HISTORY: ER PT    COUGH , SOB    PMH: CV UNKNOWN, HTN    TECHNIQUE: 2 VIEW CHEST    TECHNOLOGIST:     TRANSPORT OK    COMPARISON:  None    FINDINGS:  The heart is mildly enlarged with  perihilar stranding extending peripherally and exaggerated by the patient's body habitus and poor inspiratory effort.I see no lobar or segmental infiltrates.No significant pleural effusions are noted.No significant musculoskeletal or vascular abnormalities are appreciated.                        ED Interpretation by Ash Parra MD (02/13/23 19:03:18, [facility], Emergency Medicine)    Infiltrates, normal heart size.                                     US Lower Extremity Veins Bilateral (Final result)  Result time 02/13/23 18:44:51      Final result by Paulette Mcguire III, MD (02/13/23 18:44:51)                   Impression:      1. No evidence of deep vein thrombus.      Electronically signed by: Paulette Mcguire  Date:    02/13/2023  Time:    18:44               Narrative:    EXAMINATION:  STUDY: US LOWER EXTREMITY VEINS BILATERAL    CLINICAL HISTORY AND TECHNIQUE:  RT Isela on 2/13/2023  6:43 PM    CLINICAL HX: -ER- BLE CELLULITIS X 2 WEEKS. SOB. SENT TO ER BY URGENT CARE    PMH: HTN    TECHNIQUE: 2D VASCULAR ULTRASOUND BLE VEINS WITH COLOR MAPPING AND POWER DOPPLER    TECHNOLOGIST: ADITHYA    COMPARISON:  None    FINDINGS:  Normal flow and compression throughout both common femoral, superficial femoral, popliteal, and peroneal vein(s) with normal augmentation and no tenderness noted.                                       Medications   benzonatate capsule 200 mg (200 mg Oral Given 2/13/23 1806)     Medical Decision Making:   Initial Assessment:   Obesity, bilateral lower extremity edema. Cough  Differential Diagnosis:   Obesity, DVT's, Viral URI, congestive heart failure, anemia, liver failure, kidney problems.  ED Management:  Labs, EKG, chest x-ray, bilateral lower extremity venous ultrasound  Bilateral LE Venous US: no DVT                        Clinical Impression:   Final diagnoses:  [L03.90] Cellulitis - Pt does not have cellulitis  [R05.9] Cough  [R60.9] Edema  [R60.9]  Peripheral edema (Primary)  [I10] Hypertension, unspecified type  [E66.01, Z68.44] Class 3 severe obesity due to excess calories without serious comorbidity with body mass index (BMI) of 60.0 to 69.9 in adult        ED Disposition Condition    Discharge Good          ED Prescriptions       Medication Sig Dispense Start Date End Date Auth. Provider    benzonatate (TESSALON) 200 MG capsule Take 1 capsule (200 mg total) by mouth 3 (three) times daily as needed for Cough. 60 capsule 2/13/2023 2/23/2023 Ash Parra MD    losartan-hydrochlorothiazide 50-12.5 mg (HYZAAR) 50-12.5 mg per tablet Take 1 tablet by mouth once daily. 90 tablet 2/13/2023 2/13/2024 Ash Parra MD          Follow-up Information       Follow up With Specialties Details Why Contact Info    [facility] Call in 1 day  [address]  [address]  Family Medicine Clinic  [place] [address]  [phone]      [facility] Call in 1 day  [address]  [address]  Family Medicine Clinic  [place] [address]  [phone]               Ash Parra MD  02/13/23 2165

## 2023-07-10 ENCOUNTER — HOSPITAL ENCOUNTER (EMERGENCY)
Facility: HOSPITAL | Age: 28
Discharge: HOME | End: 2023-07-10
Attending: FAMILY MEDICINE
Payer: COMMERCIAL

## 2023-07-10 VITALS
HEART RATE: 80 BPM | DIASTOLIC BLOOD PRESSURE: 91 MMHG | OXYGEN SATURATION: 98 % | TEMPERATURE: 99 F | HEIGHT: 66 IN | SYSTOLIC BLOOD PRESSURE: 174 MMHG | WEIGHT: 315 LBS | BODY MASS INDEX: 50.62 KG/M2 | RESPIRATION RATE: 19 BRPM

## 2023-07-10 DIAGNOSIS — J06.9 UPPER RESPIRATORY TRACT INFECTION, UNSPECIFIED TYPE: Primary | ICD-10-CM

## 2023-07-10 LAB — SARS-COV-2 RDRP RESP QL NAA+PROBE: NEGATIVE

## 2023-07-10 PROCEDURE — 99284 EMERGENCY DEPT VISIT MOD MDM: CPT

## 2023-07-10 PROCEDURE — 25000003 PHARM REV CODE 250: Performed by: NURSE PRACTITIONER

## 2023-07-10 PROCEDURE — 63600175 PHARM REV CODE 636 W HCPCS: Performed by: NURSE PRACTITIONER

## 2023-07-10 PROCEDURE — 87635 SARS-COV-2 COVID-19 AMP PRB: CPT | Performed by: NURSE PRACTITIONER

## 2023-07-10 RX ORDER — BROMPHENIRAMINE MALEATE, PSEUDOEPHEDRINE HYDROCHLORIDE, AND DEXTROMETHORPHAN HYDROBROMIDE 2; 30; 10 MG/5ML; MG/5ML; MG/5ML
10 SYRUP ORAL EVERY 8 HOURS PRN
Qty: 120 ML | Refills: 0 | Status: SHIPPED | OUTPATIENT
Start: 2023-07-10 | End: 2023-07-20

## 2023-07-10 RX ORDER — PROMETHAZINE HYDROCHLORIDE 6.25 MG/5ML
6.25 SYRUP ORAL
Status: DISCONTINUED | OUTPATIENT
Start: 2023-07-10 | End: 2023-07-10

## 2023-07-10 RX ORDER — AZITHROMYCIN 250 MG/1
TABLET, FILM COATED ORAL
Qty: 6 TABLET | Refills: 0 | Status: SHIPPED | OUTPATIENT
Start: 2023-07-10 | End: 2023-07-15

## 2023-07-10 RX ORDER — CLONIDINE HYDROCHLORIDE 0.1 MG/1
0.1 TABLET ORAL
Status: DISCONTINUED | OUTPATIENT
Start: 2023-07-10 | End: 2023-07-10

## 2023-07-10 RX ORDER — CLONIDINE HYDROCHLORIDE 0.1 MG/1
0.2 TABLET ORAL
Status: COMPLETED | OUTPATIENT
Start: 2023-07-10 | End: 2023-07-10

## 2023-07-10 RX ORDER — DEXAMETHASONE 4 MG/1
4 TABLET ORAL
Status: COMPLETED | OUTPATIENT
Start: 2023-07-10 | End: 2023-07-10

## 2023-07-10 RX ORDER — DEXAMETHASONE 4 MG/1
4 TABLET ORAL DAILY
Qty: 5 TABLET | Refills: 0 | Status: SHIPPED | OUTPATIENT
Start: 2023-07-10 | End: 2023-07-15

## 2023-07-10 RX ORDER — CLONIDINE HYDROCHLORIDE 0.1 MG/1
0.1 TABLET ORAL SEE ADMIN INSTRUCTIONS
Qty: 30 TABLET | Refills: 0 | Status: SHIPPED | OUTPATIENT
Start: 2023-07-10 | End: 2023-07-26

## 2023-07-10 RX ADMIN — CLONIDINE HYDROCHLORIDE 0.2 MG: 0.1 TABLET ORAL at 06:07

## 2023-07-10 RX ADMIN — DEXAMETHASONE 4 MG: 4 TABLET ORAL at 06:07

## 2023-07-10 NOTE — ED PROVIDER NOTES
Encounter Date: 7/10/2023       History     Chief Complaint   Patient presents with    Cough     Cough x 1 week with congestion.  Reports knots pop up under right breast area when coughing.       Cough, Congestion   Patient is hypertensive on today's visit      The history is provided by the patient. No  was used.   Review of patient's allergies indicates:   Allergen Reactions    Lisinopril      Past Medical History:   Diagnosis Date    Broken ankle     Hypertension      Past Surgical History:   Procedure Laterality Date    ANKLE SURGERY       No family history on file.  Social History     Tobacco Use    Smoking status: Never    Smokeless tobacco: Never   Substance Use Topics    Alcohol use: Yes     Alcohol/week: 24.0 standard drinks     Types: 24 Cans of beer per week     Comment: ON WEEKENDS    Drug use: Never     Review of Systems   Constitutional:  Positive for fatigue.   HENT:  Positive for congestion, postnasal drip, rhinorrhea and sore throat.    Respiratory:  Positive for cough.    All other systems reviewed and are negative.    Physical Exam     Initial Vitals [07/10/23 1722]   BP Pulse Resp Temp SpO2   (!) 160/107 98 20 99.4 °F (37.4 °C) 95 %      MAP       --         Physical Exam    Nursing note and vitals reviewed.  Constitutional: He appears well-developed.   Obese    Eyes: EOM are normal. Pupils are equal, round, and reactive to light.   Neck:   Normal range of motion.  Cardiovascular:  Normal rate.           Pulmonary/Chest: No respiratory distress. He has no wheezes.   Abdominal: Abdomen is soft.   Musculoskeletal:         General: No tenderness. Normal range of motion.      Cervical back: Normal range of motion.     Neurological: He is alert and oriented to person, place, and time. GCS score is 15. GCS eye subscore is 4. GCS verbal subscore is 5. GCS motor subscore is 6.   Skin: Skin is warm. Capillary refill takes less than 2 seconds.   Psychiatric: He has a normal mood and  affect.       ED Course   Procedures  Labs Reviewed   SARS-COV-2 RNA AMPLIFICATION, QUAL - Normal          Imaging Results    None          Medications   dexAMETHasone tablet 4 mg (4 mg Oral Given 7/10/23 1807)   cloNIDine tablet 0.2 mg (0.2 mg Oral Given 7/10/23 1824)     Medical Decision Making:   Initial Assessment:   URI  Differential Diagnosis:   Strep throat, covid, viral syndrome  Clinical Tests:   Lab Tests: Ordered and Reviewed       <> Summary of Lab: Covid negative  ED Management:  Patient has uncontrolled hypertension.    He normally is on amlodipine.  Discussed low salt heart healthy diet  Needs to follow up with PCP for medication adjustments.                          Clinical Impression:   Final diagnoses:  [J06.9] Upper respiratory tract infection, unspecified type (Primary)        ED Disposition Condition    Discharge Stable          ED Prescriptions       Medication Sig Dispense Start Date End Date Auth. Provider    azithromycin (Z-YAMILE) 250 MG tablet Take 2 tablets by mouth on day 1; Take 1 tablet by mouth on days 2-5 6 tablet 7/10/2023 7/15/2023 VALERIE Busch    dexAMETHasone (DECADRON) 4 MG Tab Take 1 tablet (4 mg total) by mouth once daily. for 5 days 5 tablet 7/10/2023 7/15/2023 VALERIE Busch    brompheniramine-pseudoeph-DM (BROMFED DM) 2-30-10 mg/5 mL Syrp Take 10 mLs by mouth every 8 (eight) hours as needed (cough/congestion). 120 mL 7/10/2023 7/20/2023 VALERIE Busch          Follow-up Information       Follow up With Specialties Details Why Contact Info    [facility] Schedule an appointment as soon as possible for a visit  If symptoms worsen, As needed [address]  [address]  Family Medicine Clinic  [facility] [address]  [phone]               VALERIE Busch  07/10/23 7283

## 2023-07-10 NOTE — Clinical Note
"Ramos Nietor"Antonia was seen and treated in our emergency department on 7/10/2023.  He may return to work on 07/11/2023.       If you have any questions or concerns, please don't hesitate to call.      VALERIE Busch"

## 2023-07-10 NOTE — DISCHARGE INSTRUCTIONS
Keep log of blood pressures.  Certain medications for congestion can cause blood pressure to elevated.  If this happens then only use Corcidin HBP for cough/congestion.  Stop steroid and bromfed if blood pressure stays elevated.

## 2023-07-10 NOTE — Clinical Note
"Ramos Nietor"Antonia was seen and treated in our emergency department on 7/10/2023.  He may return to work on 07/11/2023.       If you have any questions or concerns, please don't hesitate to call.      VALERIE Busch" Statement Selected

## 2023-07-26 ENCOUNTER — OFFICE VISIT (OUTPATIENT)
Dept: FAMILY MEDICINE | Facility: CLINIC | Age: 28
End: 2023-07-26
Payer: COMMERCIAL

## 2023-07-26 VITALS
OXYGEN SATURATION: 96 % | HEIGHT: 66 IN | DIASTOLIC BLOOD PRESSURE: 90 MMHG | BODY MASS INDEX: 50.62 KG/M2 | HEART RATE: 94 BPM | WEIGHT: 315 LBS | TEMPERATURE: 98 F | SYSTOLIC BLOOD PRESSURE: 180 MMHG

## 2023-07-26 DIAGNOSIS — R09.89 PULMONARY VASCULAR CONGESTION: ICD-10-CM

## 2023-07-26 DIAGNOSIS — R60.9 EDEMA, UNSPECIFIED TYPE: ICD-10-CM

## 2023-07-26 DIAGNOSIS — R06.02 SOB (SHORTNESS OF BREATH): ICD-10-CM

## 2023-07-26 DIAGNOSIS — G47.33 OSA (OBSTRUCTIVE SLEEP APNEA): ICD-10-CM

## 2023-07-26 DIAGNOSIS — R05.9 COUGH, UNSPECIFIED TYPE: ICD-10-CM

## 2023-07-26 DIAGNOSIS — E66.01 CLASS 3 SEVERE OBESITY DUE TO EXCESS CALORIES WITHOUT SERIOUS COMORBIDITY WITH BODY MASS INDEX (BMI) OF 60.0 TO 69.9 IN ADULT: ICD-10-CM

## 2023-07-26 DIAGNOSIS — I10 HYPERTENSION, UNSPECIFIED TYPE: Primary | ICD-10-CM

## 2023-07-26 LAB
ANION GAP SERPL CALC-SCNC: 11 MEQ/L (ref 2–13)
BASOPHILS # BLD AUTO: 0.05 X10(3)/MCL (ref 0.01–0.08)
BASOPHILS NFR BLD AUTO: 0.5 % (ref 0.1–1.2)
BUN SERPL-MCNC: 14 MG/DL (ref 7–20)
CALCIUM SERPL-MCNC: 9.3 MG/DL (ref 8.4–10.2)
CHLORIDE SERPL-SCNC: 98 MMOL/L (ref 98–110)
CO2 SERPL-SCNC: 30 MMOL/L (ref 21–32)
CREAT SERPL-MCNC: 0.86 MG/DL (ref 0.66–1.25)
CREAT/UREA NIT SERPL: 16 (ref 12–20)
EOSINOPHIL # BLD AUTO: 0.29 X10(3)/MCL (ref 0.04–0.54)
EOSINOPHIL NFR BLD AUTO: 2.8 % (ref 0.7–7)
ERYTHROCYTE [DISTWIDTH] IN BLOOD BY AUTOMATED COUNT: 14.6 %
EST. AVERAGE GLUCOSE BLD GHB EST-MCNC: 108.3 MG/DL (ref 70–115)
GFR SERPLBLD CREATININE-BSD FMLA CKD-EPI: >90 MLS/MIN/1.73/M2
GLUCOSE SERPL-MCNC: 87 MG/DL (ref 70–115)
HBA1C MFR BLD: 5.4 % (ref 4–6)
HCT VFR BLD AUTO: 41.9 % (ref 36–52)
HGB BLD-MCNC: 13.8 G/DL (ref 13–18)
IMM GRANULOCYTES # BLD AUTO: 0.06 X10(3)/MCL (ref 0–0.03)
IMM GRANULOCYTES NFR BLD AUTO: 0.6 % (ref 0–0.5)
LYMPHOCYTES # BLD AUTO: 1.32 X10(3)/MCL (ref 1.32–3.57)
LYMPHOCYTES NFR BLD AUTO: 12.9 % (ref 20–55)
MCH RBC QN AUTO: 27.3 PG (ref 27–34)
MCHC RBC AUTO-ENTMCNC: 32.9 G/DL (ref 31–37)
MCV RBC AUTO: 83 FL (ref 79–99)
MONOCYTES # BLD AUTO: 0.73 X10(3)/MCL (ref 0.3–0.82)
MONOCYTES NFR BLD AUTO: 7.1 % (ref 4.7–12.5)
NEUTROPHILS # BLD AUTO: 7.8 X10(3)/MCL (ref 1.78–5.38)
NEUTROPHILS NFR BLD AUTO: 76.1 % (ref 37–73)
NRBC BLD AUTO-RTO: 0 %
PLATELET # BLD AUTO: 321 X10(3)/MCL (ref 140–371)
PMV BLD AUTO: 9.5 FL (ref 9.4–12.4)
POTASSIUM SERPL-SCNC: 3.8 MMOL/L (ref 3.5–5.1)
RBC # BLD AUTO: 5.05 X10(6)/MCL (ref 4–6)
SODIUM SERPL-SCNC: 139 MMOL/L (ref 135–145)
TSH SERPL-ACNC: 2.8 UIU/ML (ref 0.36–3.74)
WBC # SPEC AUTO: 10.25 X10(3)/MCL (ref 4–11.5)

## 2023-07-26 PROCEDURE — 83880 ASSAY OF NATRIURETIC PEPTIDE: CPT | Performed by: NURSE PRACTITIONER

## 2023-07-26 PROCEDURE — 99215 OFFICE O/P EST HI 40 MIN: CPT | Mod: ,,, | Performed by: NURSE PRACTITIONER

## 2023-07-26 PROCEDURE — 83036 HEMOGLOBIN GLYCOSYLATED A1C: CPT | Performed by: NURSE PRACTITIONER

## 2023-07-26 PROCEDURE — 99215 PR OFFICE/OUTPT VISIT, EST, LEVL V, 40-54 MIN: ICD-10-PCS | Mod: ,,, | Performed by: NURSE PRACTITIONER

## 2023-07-26 PROCEDURE — 85025 COMPLETE CBC W/AUTO DIFF WBC: CPT | Performed by: NURSE PRACTITIONER

## 2023-07-26 PROCEDURE — 80048 BASIC METABOLIC PNL TOTAL CA: CPT | Performed by: NURSE PRACTITIONER

## 2023-07-26 PROCEDURE — 84443 ASSAY THYROID STIM HORMONE: CPT | Performed by: NURSE PRACTITIONER

## 2023-07-26 RX ORDER — POTASSIUM CHLORIDE 750 MG/1
10 TABLET, EXTENDED RELEASE ORAL DAILY
Qty: 30 TABLET | Refills: 0 | Status: SHIPPED | OUTPATIENT
Start: 2023-07-26 | End: 2023-08-18 | Stop reason: SDUPTHER

## 2023-07-26 RX ORDER — TORSEMIDE 20 MG/1
20 TABLET ORAL DAILY
Qty: 30 TABLET | Refills: 0 | Status: SHIPPED | OUTPATIENT
Start: 2023-07-26 | End: 2023-08-18 | Stop reason: SDUPTHER

## 2023-07-26 RX ORDER — HYDROCHLOROTHIAZIDE 12.5 MG/1
12.5 TABLET ORAL DAILY
Qty: 30 TABLET | Refills: 0 | Status: SHIPPED | OUTPATIENT
Start: 2023-07-26 | End: 2023-08-18 | Stop reason: SDUPTHER

## 2023-07-26 RX ORDER — LOSARTAN POTASSIUM 100 MG/1
100 TABLET ORAL DAILY
Qty: 90 TABLET | Refills: 3 | Status: SHIPPED | OUTPATIENT
Start: 2023-07-26 | End: 2023-08-18 | Stop reason: SDUPTHER

## 2023-07-26 RX ORDER — CHLORTHALIDONE 25 MG/1
25 TABLET ORAL DAILY
COMMUNITY
Start: 2022-12-19 | End: 2023-07-26

## 2023-07-26 NOTE — PROGRESS NOTES
Patient ID: [id number]     Chief Complaint: Hypertension (Follow up)      HPI:     Ramos Knight is a 28 y.o. male in the office with c/o high blood pressure.  Has been seen at urgent care and ER for a persistent cough, approximately 6 months.  He reports having coughed up pink foamy mucus nearly every day.  He gets short of breath very easily.  He reports a significant amount of weight gain and swelling in both of his legs.  He does not have any insurance.  He was prescribed blood pressure medication but not able to afford most of it.  He has been given 2 or 3 courses of antibiotics and steroids for his cough which has never improved.  He has long suspected to have obstructive sleep apnea but has never been able to afford the test.  He has been sleeping with  relatives CPAP machine and reports that he wakes feeling more rested.  He has had high blood pressure for many years and has typically been noncompliant with medications and follow-up.    Past Medical History:  has a past medical history of Broken ankle and Hypertension.    Social History:  reports that he has never smoked. He has never been exposed to tobacco smoke. He has never used smokeless tobacco. He reports current alcohol use of about 24.0 standard drinks of alcohol per week. He reports that he does not use drugs.    Current Outpatient Medications   Medication Instructions    albuterol (PROVENTIL/VENTOLIN HFA) 90 mcg/actuation inhaler 2 puffs, Inhalation, Every 6 hours PRN    hydroCHLOROthiazide (HYDRODIURIL) 12.5 mg, Oral, Daily    losartan (COZAAR) 100 mg, Oral, Daily    metoprolol succinate (TOPROL-XL) 25 mg, Oral, Daily    potassium chloride SA (K-DUR,KLOR-CON M) 10 MEQ tablet 10 mEq, Oral, Daily    torsemide (DEMADEX) 20 mg, Oral, Daily       Patient is allergic to lisinopril.     Patient Care Team:  Candie Knight FNP-C as PCP - General (Family Medicine)       Subjective     Review of Systems    See HPI     Objective     Visit  "Vitals  BP (!) 180/90 (BP Location: Right arm, Patient Position: Sitting)   Pulse 94   Temp 98.2 °F (36.8 °C) (Temporal)   Ht 5' 6" (1.676 m)   Wt (!) 194.4 kg (428 lb 9.2 oz)   SpO2 96%   BMI 69.17 kg/m²       Physical Exam  Vitals reviewed.   Constitutional:       General: He is not in acute distress.     Appearance: Normal appearance. He is morbidly obese. He is ill-appearing.   Cardiovascular:      Rate and Rhythm: Normal rate and regular rhythm.      Heart sounds: Heart sounds are distant.   Pulmonary:      Effort: Accessory muscle usage present. No respiratory distress.      Breath sounds: Decreased air movement and transmitted upper airway sounds present. Examination of the right-upper field reveals rales. Examination of the left-upper field reveals rales. Examination of the right-lower field reveals decreased breath sounds. Examination of the left-lower field reveals decreased breath sounds. Decreased breath sounds and rales present.   Musculoskeletal:      Right lower le+ Pitting Edema present.      Left lower le+ Pitting Edema present.   Skin:     General: Skin is warm and dry.      Capillary Refill: Capillary refill takes less than 2 seconds.   Neurological:      Mental Status: He is alert and oriented to person, place, and time.   Psychiatric:         Attention and Perception: Attention normal.         Mood and Affect: Mood is depressed.         Speech: Speech normal.         Behavior: Behavior normal. Behavior is cooperative.         Thought Content: Thought content normal.       Assessment:       ICD-10-CM ICD-9-CM   1. Hypertension, unspecified type  I10 401.9   2. Pulmonary vascular congestion  R09.89 514   3. SOB (shortness of breath)  R06.02 786.05   4. Edema, unspecified type  R60.9 782.3   5. GOLDEN (obstructive sleep apnea)  G47.33 327.23   6. Class 3 severe obesity due to excess calories without serious comorbidity with body mass index (BMI) of 60.0 to 69.9 in adult  E66.01 278.01    " Z68.44 V85.44   7. Cough, unspecified type  R05.9 786.2      Plan:     CXR, labs now.  Suspect CHF after review of case including film with Dr. Zelaya  Start torsemide  Increase losartan to 100mg  Continue HCTZ 12.5 mg  Start KCl 10 meq  Low sodium diet  Consult case management for financial assistance as he will need further workup with echo and evaluation by cardiologist.  He is also in need of home sleep test.    Consult cardiology    I spent a total of 40 minutes on the day of the visit.  This includes face to face time and non-face to face time preparing to see the patient (eg, review of tests), obtaining and/or reviewing separately obtained history, documenting clinical information in the electronic or other health record, independently interpreting results and communicating results to the patient/family/caregiver, or care coordinator.     Follow up in about 1 week (around 8/2/2023). In addition to their next scheduled appointment, the patient has also been instructed to follow up on as needed basis.     Future Appointments   Date Time Provider Department Center   8/18/2023  7:30 AM Candie Knight FNP-C [facility]   9/27/2023  8:30 AM Candie Knight FNP-C [facility]

## 2023-07-27 LAB — BNP BLD-MCNC: 18 PG/ML

## 2023-08-03 ENCOUNTER — OFFICE VISIT (OUTPATIENT)
Dept: FAMILY MEDICINE | Facility: CLINIC | Age: 28
End: 2023-08-03
Payer: COMMERCIAL

## 2023-08-03 VITALS
BODY MASS INDEX: 50.62 KG/M2 | HEART RATE: 88 BPM | SYSTOLIC BLOOD PRESSURE: 160 MMHG | TEMPERATURE: 98 F | OXYGEN SATURATION: 95 % | WEIGHT: 315 LBS | HEIGHT: 66 IN | DIASTOLIC BLOOD PRESSURE: 100 MMHG

## 2023-08-03 DIAGNOSIS — I10 HYPERTENSION, UNSPECIFIED TYPE: Primary | ICD-10-CM

## 2023-08-03 DIAGNOSIS — R05.9 COUGH, UNSPECIFIED TYPE: ICD-10-CM

## 2023-08-03 DIAGNOSIS — R60.0 PERIPHERAL EDEMA: ICD-10-CM

## 2023-08-03 DIAGNOSIS — R09.89 PULMONARY VASCULAR CONGESTION: ICD-10-CM

## 2023-08-03 PROBLEM — F32.9 MAJOR DEPRESSIVE DISORDER: Status: ACTIVE | Noted: 2023-08-03

## 2023-08-03 LAB
ANION GAP SERPL CALC-SCNC: 14 MEQ/L (ref 2–13)
BUN SERPL-MCNC: 16 MG/DL (ref 7–20)
CALCIUM SERPL-MCNC: 9.1 MG/DL (ref 8.4–10.2)
CHLORIDE SERPL-SCNC: 97 MMOL/L (ref 98–110)
CO2 SERPL-SCNC: 32 MMOL/L (ref 21–32)
CREAT SERPL-MCNC: 0.77 MG/DL (ref 0.66–1.25)
CREAT/UREA NIT SERPL: 21 (ref 12–20)
GFR SERPLBLD CREATININE-BSD FMLA CKD-EPI: >90 MLS/MIN/1.73/M2
GLUCOSE SERPL-MCNC: 109 MG/DL (ref 70–115)
POTASSIUM SERPL-SCNC: 4 MMOL/L (ref 3.5–5.1)
SODIUM SERPL-SCNC: 143 MMOL/L (ref 135–145)

## 2023-08-03 PROCEDURE — 99214 OFFICE O/P EST MOD 30 MIN: CPT | Mod: ,,, | Performed by: NURSE PRACTITIONER

## 2023-08-03 PROCEDURE — 80048 BASIC METABOLIC PNL TOTAL CA: CPT | Performed by: NURSE PRACTITIONER

## 2023-08-03 PROCEDURE — 99214 PR OFFICE/OUTPT VISIT, EST, LEVL IV, 30-39 MIN: ICD-10-PCS | Mod: ,,, | Performed by: NURSE PRACTITIONER

## 2023-08-03 RX ORDER — METOPROLOL SUCCINATE 25 MG/1
25 TABLET, EXTENDED RELEASE ORAL DAILY
Qty: 30 TABLET | Refills: 0 | Status: SHIPPED | OUTPATIENT
Start: 2023-08-03 | End: 2023-08-18 | Stop reason: SDUPTHER

## 2023-08-03 NOTE — PROGRESS NOTES
Patient ID: [id number]     Chief Complaint: Hypertension      HPI:     Ramos Knight is a 28 y.o. male here today for follow up on blood pressure and cough.  Lab and xray results reviewed with the patient.  He's had a 16 lb weight loss in the last 8 days after addition of torsemide.  He reports feeling much better than he did last week.  He still has a cough but now it is dry and only occasionally occurring.  He stopped drinking soda is and has been monitoring his sodium intake.  He started walking on an incline on the treadmill at the gym.  E decided to take the torsemide every other day because he was having cramps in his legs.  He is taking potassium 10 meq every day.  He is sleeping with the CPAP machine that he received from a relative.  He no longer feels short of breath.    Past Medical History:  has a past medical history of Broken ankle and Hypertension.    Surgical History:  has a past surgical history that includes Ankle surgery.    Family History: family history includes Breast cancer in his mother; Diabetes in his mother.    Social History:  reports that he has never smoked. He has never been exposed to tobacco smoke. He has never used smokeless tobacco. He reports current alcohol use of about 24.0 standard drinks of alcohol per week. He reports that he does not use drugs.    Current Outpatient Medications   Medication Instructions    albuterol (PROVENTIL/VENTOLIN HFA) 90 mcg/actuation inhaler 2 puffs, Inhalation, Every 6 hours PRN    hydroCHLOROthiazide (HYDRODIURIL) 12.5 mg, Oral, Daily    losartan (COZAAR) 100 mg, Oral, Daily    metoprolol succinate (TOPROL-XL) 25 mg, Oral, Daily    potassium chloride SA (K-DUR,KLOR-CON M) 10 MEQ tablet 10 mEq, Oral, Daily    torsemide (DEMADEX) 20 mg, Oral, Daily       Patient is allergic to lisinopril.     Patient Care Team:  Candie Knight FNP-C as PCP - General (Family Medicine)       Subjective:     Review of Systems    See HPI     Objective:     Visit  "Vitals  BP (!) 160/100 (BP Location: Right arm)   Pulse 88   Temp 98.1 °F (36.7 °C) (Temporal)   Ht 5' 6" (1.676 m)   Wt (!) 186.9 kg (412 lb)   SpO2 95%   BMI 66.50 kg/m²       Physical Exam  Vitals reviewed.   Constitutional:       Appearance: Normal appearance. He is morbidly obese.   Cardiovascular:      Rate and Rhythm: Normal rate and regular rhythm.   Pulmonary:      Effort: Pulmonary effort is normal.      Breath sounds: Examination of the right-lower field reveals decreased breath sounds and rales. Examination of the left-lower field reveals decreased breath sounds and rales. Decreased breath sounds and rales (few) present.   Musculoskeletal:      Right lower le+ Pitting Edema present.      Left lower le+ Pitting Edema present.   Skin:     General: Skin is warm and dry.   Neurological:      Mental Status: He is alert and oriented to person, place, and time.      Gait: Gait abnormal.   Psychiatric:         Mood and Affect: Mood normal.         Behavior: Behavior normal. Behavior is cooperative.         Thought Content: Thought content normal.       Labs Reviewed:     Chemistry:  Lab Results   Component Value Date     2023    K 3.8 2023    CHLORIDE 98 2023    BUN 14.0 2023    CREATININE 0.86 2023    EGFRNORACEVR >90 2023    GLUCOSE 87 2023    CALCIUM 9.3 2023    ALKPHOS 59 2023    LABPROT 7.0 2023    ALBUMIN 3.8 2023    AST 31 2023    ALT 31 2023    TSH 2.800 2023      Lab Results   Component Value Date    HGBA1C 5.4 2023      Hematology:  Lab Results   Component Value Date    WBC 10.25 2023    RBC 5.05 2023    HGB 13.8 2023    HCT 41.9 2023    MCV 83.0 2023    MCH 27.3 2023    MCHC 32.9 2023    RDW 14.6 2023     2023    MPV 9.5 2023     Assessment:       ICD-10-CM ICD-9-CM   1. Hypertension, unspecified type  I10 401.9   2. Pulmonary " vascular congestion  R09.89 514   3. Cough, unspecified type  R05.9 786.2   4. Peripheral edema  R60.9 782.3      Plan:     Repeat BP improved - 138/98  Repeat BMP today  Continue above medications  Start metoprolol  Continue with plan for workups by cardiology    Medications Ordered This Encounter   Medications    metoprolol succinate (TOPROL-XL) 25 MG 24 hr tablet     Sig: Take 1 tablet (25 mg total) by mouth once daily.     Dispense:  30 tablet     Refill:  0     .        Follow up in about 2 weeks (around 8/17/2023) for BP. In addition to their scheduled follow up, the patient has also been instructed to follow up on as needed basis.     Future Appointments   Date Time Provider Department Center   8/18/2023  7:30 AM Candie Knight FNP-C [facility]   9/27/2023  8:30 AM Candie Knight FNP-C [facility]

## 2023-08-18 ENCOUNTER — OFFICE VISIT (OUTPATIENT)
Dept: FAMILY MEDICINE | Facility: CLINIC | Age: 28
End: 2023-08-18
Payer: COMMERCIAL

## 2023-08-18 VITALS
DIASTOLIC BLOOD PRESSURE: 88 MMHG | HEART RATE: 78 BPM | TEMPERATURE: 98 F | SYSTOLIC BLOOD PRESSURE: 138 MMHG | WEIGHT: 315 LBS | BODY MASS INDEX: 50.62 KG/M2 | HEIGHT: 66 IN | OXYGEN SATURATION: 95 %

## 2023-08-18 DIAGNOSIS — R60.0 PERIPHERAL EDEMA: ICD-10-CM

## 2023-08-18 DIAGNOSIS — R09.89 PULMONARY VASCULAR CONGESTION: ICD-10-CM

## 2023-08-18 DIAGNOSIS — I10 HYPERTENSION, UNSPECIFIED TYPE: Primary | ICD-10-CM

## 2023-08-18 DIAGNOSIS — E66.01 CLASS 3 SEVERE OBESITY DUE TO EXCESS CALORIES WITHOUT SERIOUS COMORBIDITY WITH BODY MASS INDEX (BMI) OF 60.0 TO 69.9 IN ADULT: ICD-10-CM

## 2023-08-18 PROCEDURE — 99213 OFFICE O/P EST LOW 20 MIN: CPT | Mod: ,,, | Performed by: NURSE PRACTITIONER

## 2023-08-18 PROCEDURE — 99213 PR OFFICE/OUTPT VISIT, EST, LEVL III, 20-29 MIN: ICD-10-PCS | Mod: ,,, | Performed by: NURSE PRACTITIONER

## 2023-08-18 RX ORDER — LOSARTAN POTASSIUM 100 MG/1
100 TABLET ORAL DAILY
Qty: 90 TABLET | Refills: 3 | Status: SHIPPED | OUTPATIENT
Start: 2023-08-18 | End: 2023-09-22 | Stop reason: SDUPTHER

## 2023-08-18 RX ORDER — METOPROLOL SUCCINATE 25 MG/1
25 TABLET, EXTENDED RELEASE ORAL DAILY
Qty: 30 TABLET | Refills: 0 | Status: SHIPPED | OUTPATIENT
Start: 2023-08-18 | End: 2023-08-29 | Stop reason: SDUPTHER

## 2023-08-18 RX ORDER — POTASSIUM CHLORIDE 750 MG/1
10 TABLET, EXTENDED RELEASE ORAL DAILY
Qty: 30 TABLET | Refills: 0 | Status: SHIPPED | OUTPATIENT
Start: 2023-08-18 | End: 2023-09-22 | Stop reason: SDUPTHER

## 2023-08-18 RX ORDER — LIRAGLUTIDE 6 MG/ML
0.6 INJECTION SUBCUTANEOUS DAILY
Qty: 3 ML | Refills: 11 | Status: SHIPPED | OUTPATIENT
Start: 2023-08-18 | End: 2023-08-18 | Stop reason: DRUGHIGH

## 2023-08-18 RX ORDER — LIRAGLUTIDE 6 MG/ML
INJECTION SUBCUTANEOUS
Qty: 6.3 ML | Refills: 0 | Status: SHIPPED | OUTPATIENT
Start: 2023-08-18 | End: 2023-09-22 | Stop reason: SDUPTHER

## 2023-08-18 RX ORDER — HYDROCHLOROTHIAZIDE 12.5 MG/1
12.5 TABLET ORAL DAILY
Qty: 30 TABLET | Refills: 0 | Status: SHIPPED | OUTPATIENT
Start: 2023-08-18 | End: 2023-10-18 | Stop reason: SDUPTHER

## 2023-08-18 RX ORDER — TORSEMIDE 20 MG/1
20 TABLET ORAL DAILY
Qty: 30 TABLET | Refills: 0 | Status: SHIPPED | OUTPATIENT
Start: 2023-08-18 | End: 2023-09-22 | Stop reason: SDUPTHER

## 2023-08-18 NOTE — PROGRESS NOTES
Patient ID: [id number]     Chief Complaint: Hypertension      HPI:     Ramos Knight is a 28 y.o. male in the office following up on high blood pressure.  He has gained 1 lb.  He was contacted about a Medicaid application but upon further review he was not eligible.  He is still coughing occasionally but minimal and nonproductive.  He denies any shortness of breath.  In the last 2 weeks he has not been to the gym to walk on the treadmill.  Has been following a low-sodium diet and attempting to eat healthier.  Still using his grandmother's old CPAP machine most nights unless he falls asleep on the couch.  He does have more energy at work and overall feels better than he did a month ago.      Past Medical History:  has a past medical history of Broken ankle and Hypertension.    Social History:  reports that he has never smoked. He has never been exposed to tobacco smoke. He has never used smokeless tobacco. He reports current alcohol use of about 24.0 standard drinks of alcohol per week. He reports that he does not use drugs.    Current Outpatient Medications   Medication Instructions    albuterol (PROVENTIL/VENTOLIN HFA) 90 mcg/actuation inhaler 2 puffs, Inhalation, Every 6 hours PRN    hydroCHLOROthiazide (HYDRODIURIL) 12.5 mg, Oral, Daily    liraglutide 0.6 mg/0.1 mL, 18 mg/3 mL, subq PNIJ (VICTOZA 3-YAMILE) 0.6 mg/0.1 mL (18 mg/3 mL) PnIj pen Inject 0.6 mg into the skin once daily for 7 days, THEN 1.2 mg once daily for 7 days, THEN 1.8 mg once daily for 14 days.    losartan (COZAAR) 100 mg, Oral, Daily    metoprolol succinate (TOPROL-XL) 25 mg, Oral, Daily    potassium chloride SA (K-DUR,KLOR-CON M) 10 MEQ tablet 10 mEq, Oral, Daily    torsemide (DEMADEX) 20 mg, Oral, Daily       Patient is allergic to lisinopril.     Patient Care Team:  Candie Knight FNP-C as PCP - General (Family Medicine)       Subjective     Review of Systems    See HPI     Objective     Visit Vitals  /88 (BP Location: Left arm)  "  Pulse 78   Temp 98.4 °F (36.9 °C) (Temporal)   Ht 5' 6" (1.676 m)   Wt (!) 187.6 kg (413 lb 8 oz)   SpO2 95%   BMI 66.74 kg/m²       Physical Exam  Vitals reviewed.   Constitutional:       Appearance: Normal appearance. He is morbidly obese.   Cardiovascular:      Rate and Rhythm: Normal rate and regular rhythm.   Pulmonary:      Effort: Pulmonary effort is normal.      Breath sounds: Examination of the right-lower field reveals decreased breath sounds and rales. Examination of the left-lower field reveals decreased breath sounds and rales. Decreased breath sounds and rales (few) present.   Musculoskeletal:      Right lower le+ Pitting Edema present.      Left lower le+ Pitting Edema present.   Skin:     General: Skin is warm and dry.   Neurological:      Mental Status: He is alert and oriented to person, place, and time.      Gait: Gait abnormal.   Psychiatric:         Mood and Affect: Mood normal.         Behavior: Behavior normal. Behavior is cooperative.         Thought Content: Thought content normal.       Assessment:       ICD-10-CM ICD-9-CM   1. Hypertension, unspecified type  I10 401.9   2. Class 3 severe obesity due to excess calories without serious comorbidity with body mass index (BMI) of 60.0 to 69.9 in adult  E66.01 278.01    Z68.44 V85.44   3. Pulmonary vascular congestion  R09.89 514   4. Peripheral edema  R60.9 782.3        Plan:     Continue HTN medication without change.   Encouraged him to walk without getting his heart rate over 130.  Start Victoza and titrate  up as directed  [place] referral today to see what financial assistance may be available to him.     Follow up in about 1 month (around 2023) for Obesity F/U, BP. In addition to their next scheduled appointment, the patient has also been instructed to follow up on as needed basis.     Future Appointments   Date Time Provider Department Center   2023 11:30 AM Candie Knight FNP-C [facility]   2023  " 8:30 AM Candie Knight, LIANEP-C [facility]

## 2023-08-29 DIAGNOSIS — I10 HYPERTENSION, UNSPECIFIED TYPE: Primary | ICD-10-CM

## 2023-08-30 RX ORDER — METOPROLOL SUCCINATE 25 MG/1
25 TABLET, EXTENDED RELEASE ORAL DAILY
Qty: 30 TABLET | Refills: 0 | Status: SHIPPED | OUTPATIENT
Start: 2023-08-30 | End: 2023-09-22 | Stop reason: SDUPTHER

## 2023-08-30 NOTE — TELEPHONE ENCOUNTER
Pt verbalized understanding.    can you please have this patient contact Ms. Corona in the business office to tell her that he didn't qualify for financial assistance and that he needs to see specialist and have more test.  I was told by Page who was told by Amaris that this is what the self pay patients who need further care are supposed to do.  She is to help them with financial assistance.

## 2023-08-31 ENCOUNTER — HOSPITAL ENCOUNTER (EMERGENCY)
Facility: HOSPITAL | Age: 28
Discharge: HOME | End: 2023-08-31
Attending: STUDENT IN AN ORGANIZED HEALTH CARE EDUCATION/TRAINING PROGRAM
Payer: COMMERCIAL

## 2023-08-31 VITALS
BODY MASS INDEX: 50.62 KG/M2 | DIASTOLIC BLOOD PRESSURE: 80 MMHG | SYSTOLIC BLOOD PRESSURE: 150 MMHG | HEART RATE: 75 BPM | HEIGHT: 66 IN | RESPIRATION RATE: 20 BRPM | OXYGEN SATURATION: 95 % | TEMPERATURE: 98 F | WEIGHT: 315 LBS

## 2023-08-31 DIAGNOSIS — R07.9 CHEST PAIN: ICD-10-CM

## 2023-08-31 DIAGNOSIS — M94.0 COSTOCHONDRITIS: ICD-10-CM

## 2023-08-31 DIAGNOSIS — R07.9 CHEST PAIN, UNSPECIFIED TYPE: Primary | ICD-10-CM

## 2023-08-31 LAB
ALBUMIN SERPL-MCNC: 4.1 G/DL (ref 3.4–5)
ALBUMIN/GLOB SERPL: 1.1 RATIO
ALP SERPL-CCNC: 62 UNIT/L (ref 50–144)
ALT SERPL-CCNC: 22 UNIT/L (ref 1–45)
ANION GAP SERPL CALC-SCNC: 10 MEQ/L (ref 2–13)
AST SERPL-CCNC: 28 UNIT/L (ref 17–59)
BASOPHILS # BLD AUTO: 0.05 X10(3)/MCL (ref 0.01–0.08)
BASOPHILS NFR BLD AUTO: 0.6 % (ref 0.1–1.2)
BILIRUB SERPL-MCNC: 0.5 MG/DL (ref 0–1)
BNP BLD-MCNC: 69.5 PG/ML (ref 0–124.9)
BUN SERPL-MCNC: 17 MG/DL (ref 7–20)
CALCIUM SERPL-MCNC: 9.1 MG/DL (ref 8.4–10.2)
CHLORIDE SERPL-SCNC: 97 MMOL/L (ref 98–110)
CO2 SERPL-SCNC: 31 MMOL/L (ref 21–32)
CREAT SERPL-MCNC: 0.94 MG/DL (ref 0.66–1.25)
CREAT/UREA NIT SERPL: 18 (ref 12–20)
D DIMER PPP IA.FEU-MCNC: 0.4 MG/L (ref 0.19–0.5)
EOSINOPHIL # BLD AUTO: 0.27 X10(3)/MCL (ref 0.04–0.54)
EOSINOPHIL NFR BLD AUTO: 3.2 % (ref 0.7–7)
ERYTHROCYTE [DISTWIDTH] IN BLOOD BY AUTOMATED COUNT: 14 %
GFR SERPLBLD CREATININE-BSD FMLA CKD-EPI: >90 MLS/MIN/1.73/M2
GLOBULIN SER-MCNC: 3.6 GM/DL (ref 2–3.9)
GLUCOSE SERPL-MCNC: 104 MG/DL (ref 70–115)
HCT VFR BLD AUTO: 43.5 % (ref 36–52)
HGB BLD-MCNC: 14.2 G/DL (ref 13–18)
IMM GRANULOCYTES # BLD AUTO: 0.03 X10(3)/MCL (ref 0–0.03)
IMM GRANULOCYTES NFR BLD AUTO: 0.4 % (ref 0–0.5)
LYMPHOCYTES # BLD AUTO: 1.16 X10(3)/MCL (ref 1.32–3.57)
LYMPHOCYTES NFR BLD AUTO: 13.9 % (ref 20–55)
MCH RBC QN AUTO: 27.7 PG (ref 27–34)
MCHC RBC AUTO-ENTMCNC: 32.6 G/DL (ref 31–37)
MCV RBC AUTO: 84.8 FL (ref 79–99)
MONOCYTES # BLD AUTO: 0.6 X10(3)/MCL (ref 0.3–0.82)
MONOCYTES NFR BLD AUTO: 7.2 % (ref 4.7–12.5)
NEUTROPHILS # BLD AUTO: 6.26 X10(3)/MCL (ref 1.78–5.38)
NEUTROPHILS NFR BLD AUTO: 74.7 % (ref 37–73)
NRBC BLD AUTO-RTO: 0 %
PLATELET # BLD AUTO: 321 X10(3)/MCL (ref 140–371)
PMV BLD AUTO: 9.4 FL (ref 9.4–12.4)
POTASSIUM SERPL-SCNC: 3.5 MMOL/L (ref 3.5–5.1)
PROT SERPL-MCNC: 7.7 GM/DL (ref 6.3–8.2)
RBC # BLD AUTO: 5.13 X10(6)/MCL (ref 4–6)
SODIUM SERPL-SCNC: 138 MMOL/L (ref 135–145)
TROPONIN I SERPL-MCNC: <0.012 NG/ML (ref 0–0.03)
WBC # SPEC AUTO: 8.37 X10(3)/MCL (ref 4–11.5)

## 2023-08-31 PROCEDURE — 63600175 PHARM REV CODE 636 W HCPCS: Performed by: STUDENT IN AN ORGANIZED HEALTH CARE EDUCATION/TRAINING PROGRAM

## 2023-08-31 PROCEDURE — 83880 ASSAY OF NATRIURETIC PEPTIDE: CPT | Performed by: STUDENT IN AN ORGANIZED HEALTH CARE EDUCATION/TRAINING PROGRAM

## 2023-08-31 PROCEDURE — 85379 FIBRIN DEGRADATION QUANT: CPT | Performed by: STUDENT IN AN ORGANIZED HEALTH CARE EDUCATION/TRAINING PROGRAM

## 2023-08-31 PROCEDURE — 93010 ELECTROCARDIOGRAM REPORT: CPT | Mod: ,,, | Performed by: INTERNAL MEDICINE

## 2023-08-31 PROCEDURE — 99285 EMERGENCY DEPT VISIT HI MDM: CPT | Mod: 25

## 2023-08-31 PROCEDURE — 93010 EKG 12-LEAD: ICD-10-PCS | Mod: ,,, | Performed by: INTERNAL MEDICINE

## 2023-08-31 PROCEDURE — 96374 THER/PROPH/DIAG INJ IV PUSH: CPT

## 2023-08-31 PROCEDURE — 80053 COMPREHEN METABOLIC PANEL: CPT | Performed by: STUDENT IN AN ORGANIZED HEALTH CARE EDUCATION/TRAINING PROGRAM

## 2023-08-31 PROCEDURE — 85025 COMPLETE CBC W/AUTO DIFF WBC: CPT | Performed by: STUDENT IN AN ORGANIZED HEALTH CARE EDUCATION/TRAINING PROGRAM

## 2023-08-31 PROCEDURE — 84484 ASSAY OF TROPONIN QUANT: CPT | Performed by: STUDENT IN AN ORGANIZED HEALTH CARE EDUCATION/TRAINING PROGRAM

## 2023-08-31 PROCEDURE — 93005 ELECTROCARDIOGRAM TRACING: CPT

## 2023-08-31 PROCEDURE — 36415 COLL VENOUS BLD VENIPUNCTURE: CPT | Performed by: STUDENT IN AN ORGANIZED HEALTH CARE EDUCATION/TRAINING PROGRAM

## 2023-08-31 RX ORDER — KETOROLAC TROMETHAMINE 30 MG/ML
15 INJECTION, SOLUTION INTRAMUSCULAR; INTRAVENOUS ONCE
Status: COMPLETED | OUTPATIENT
Start: 2023-08-31 | End: 2023-08-31

## 2023-08-31 RX ORDER — NAPROXEN 500 MG/1
500 TABLET ORAL 2 TIMES DAILY
Qty: 30 TABLET | Refills: 0 | Status: SHIPPED | OUTPATIENT
Start: 2023-08-31 | End: 2023-08-31 | Stop reason: SDUPTHER

## 2023-08-31 RX ORDER — NAPROXEN 500 MG/1
500 TABLET ORAL 2 TIMES DAILY
Qty: 30 TABLET | Refills: 0 | Status: SHIPPED | OUTPATIENT
Start: 2023-08-31 | End: 2024-03-05

## 2023-08-31 RX ADMIN — KETOROLAC TROMETHAMINE 15 MG: 30 INJECTION, SOLUTION INTRAMUSCULAR at 06:08

## 2023-08-31 NOTE — DISCHARGE INSTRUCTIONS
Follow with primary care physician and cardiologist.  Take medications as prescribed.  Worsening symptoms return to the emergency department.

## 2023-08-31 NOTE — ED PROVIDER NOTES
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  Patient reports that he was at work hour prior to arrival when he developed substernal sharp chest pain.  Reports pain is stabbing in nature.  Reports pain is also reproducible with palpation over the chest wall.  Denies diaphoresis, back pain, abdominal pain, back pain.  Reports that pain is intermittent.      The history is provided by the patient.     Review of patient's allergies indicates:   Allergen Reactions    Lisinopril      Past Medical History:   Diagnosis Date    Broken ankle     CHF (congestive heart failure)     Hypertension      Past Surgical History:   Procedure Laterality Date    ANKLE SURGERY       Family History   Problem Relation Age of Onset    Diabetes Mother     Breast cancer Mother      Social History     Tobacco Use    Smoking status: Never     Passive exposure: Never    Smokeless tobacco: Never   Substance Use Topics    Alcohol use: Yes     Alcohol/week: 24.0 standard drinks of alcohol     Types: 24 Cans of beer per week     Comment: ON WEEKENDS    Drug use: Never     Review of Systems   Constitutional:  Negative for activity change and appetite change.   Respiratory:  Negative for chest tightness and shortness of breath.    Cardiovascular:  Positive for chest pain.   Gastrointestinal:  Positive for abdominal pain.       Physical Exam     Initial Vitals   BP Pulse Resp Temp SpO2   08/31/23 0600 08/31/23 0600 08/31/23 0600 08/31/23 0600 08/31/23 0650   (!) 191/88 85 (!) 22 97 °F (36.1 °C) 97 %      MAP       --                Physical Exam    Nursing note and vitals reviewed.  Constitutional: He appears well-developed and well-nourished.   HENT:   Head: Normocephalic and atraumatic.   Eyes: EOM are  normal. Pupils are equal, round, and reactive to light.   Neck:   Normal range of motion.  Cardiovascular:  Normal rate, regular rhythm and normal heart sounds.           Pulmonary/Chest: Breath sounds normal.   Abdominal: Abdomen is soft. Bowel sounds are normal.   Musculoskeletal:         General: Normal range of motion.      Cervical back: Normal range of motion.     Neurological: He is alert and oriented to person, place, and time. He has normal strength and normal reflexes. GCS score is 15. GCS eye subscore is 4. GCS verbal subscore is 5. GCS motor subscore is 6.   Skin: Skin is warm and dry. Capillary refill takes less than 2 seconds.       ED Course   Procedures  Labs Reviewed   COMPREHENSIVE METABOLIC PANEL - Abnormal; Notable for the following components:       Result Value    Chloride 97 (*)     All other components within normal limits   CBC WITH DIFFERENTIAL - Abnormal; Notable for the following components:    Neut % 74.7 (*)     Lymph % 13.9 (*)     Lymph # 1.16 (*)     Neut # 6.26 (*)     All other components within normal limits   TROPONIN I - Normal   NT-PRO NATRIURETIC PEPTIDE - Normal   D DIMER, QUANTITATIVE - Normal   CBC W/ AUTO DIFFERENTIAL    Narrative:     The following orders were created for panel order CBC auto differential.  Procedure                               Abnormality         Status                     ---------                               -----------         ------                     CBC with Differential[[id number]]        Abnormal            Final result                 Please view results for these tests on the individual orders.     EKG Readings: (Independently Interpreted)   Initial Reading: No STEMI. Rhythm: Normal Sinus Rhythm. Heart Rate: 89. Ectopy: No Ectopy. Conduction: Normal. ST Segments: Normal ST Segments. T Waves: Normal. Axis: Normal.       Imaging Results              X-Ray Chest AP Portable (Final result)  Result time 08/31/23 07:06:01      Final result by  Paulette Mcguire III, MD (08/31/23 07:06:01)                   Impression:      1. The heart is mildly to moderately enlarged with vascular congestion but no evidence of litzy decompensation.  The findings are exaggerated by the AP technique, size of the patient and less than optimal inspiratory effort.      Electronically signed by: Paulette Mcguire  Date:    08/31/2023  Time:    07:06               Narrative:    EXAMINATION:  STUDY: XR CHEST AP PORTABLE    CLINICAL HISTORY AND TECHNIQUE:  Virgil Rubin RT on 8/31/2023  6:30 AMCLINICAL HX: ER PT  X 1 HOUR PTA  C/O MIDSTERNAL CHEST PAIN AND SOB    PAST MEDICAL HX: CHF, HTN    TECHNIQUE:  1V PORTABLE CHEST    TECH: NN    COMPARISON:  07/26/2023    FINDINGS:  The heart is mildly to moderately enlarged with vascular congestion but no evidence of litzy decompensation.I see no lobar or segmental infiltrates.No significant pleural effusions are noted.No significant musculoskeletal or vascular abnormalities are appreciated.                                       Medications   ketorolac injection 15 mg (15 mg Intravenous Given 8/31/23 0633)     Medical Decision Making  28-year-old male presents to ED for complaints of chest pain.  In ED patient is afebrile, nontoxic, in no acute distress.  Vital signs reviewed and show the patient is hypertensive.  Physical exam shows normal heart and lung sounds.  Pain is reproducible with palpation over the chest wall.  Prior medical records reviewed and does not show any notes about history of heart failure or prior echoes performed.  Patient medication does show that he is not on diuretic and metoprolol.  We will plan to order labs including CBC, chemistry, troponin, BNP, EKG, chest x-ray, monitor with the cardiac monitor, and reassess.  Differential includes but not limited to ACS, costochondritis, pericarditis, pneumonia.      Troponin negative.  EKG does not show any signs of STEMI.  Labs reveal no elevation in troponin.  No leukocytosis or  electrolyte abnormalities are noted.  Chest x-ray shows cardiomegaly with some congestion.  No pulmonary edema or pleural effusions.  Patient feeling better, pain is reproducible over chest wall.  Likely has costochondritis.  Conversation had with patient regarding his health and patient understands need to follow with primary care physician and cardiologist.  Reports that he is currently trying to get insurance.  Will be discharged home with NSAIDs and strict return precautions given.  Patient understands and agrees with plan.    Amount and/or Complexity of Data Reviewed  Labs: ordered.  Radiology: ordered.    Risk  Prescription drug management.      Additional MDM:   Heart Score:    History:          Moderately suspicious.  ECG:             Normal  Age:               Less than 45 years  Risk factors: 1-2 risk factors  Troponin:       Less than or equal to normal limit  Heart Score = 2                ED Course as of 08/31/23 0727   Thu Aug 31, 2023   0710 Resp(!): 22 [UR]      ED Course User Index  [UR] Nidhi Gandhi MD                    Clinical Impression:   Final diagnoses:  [R07.9] Chest pain  [R07.9] Chest pain, unspecified type (Primary)  [M94.0] Costochondritis        ED Disposition Condition    Discharge Stable          ED Prescriptions       Medication Sig Dispense Start Date End Date Auth. Provider    naproxen (NAPROSYN) 500 MG tablet Take 1 tablet (500 mg total) by mouth 2 (two) times daily. 30 tablet 8/31/2023 -- Nidhi Gandhi MD          Follow-up Information       Follow up With Specialties Details Why Contact Info    Candie Knight, FNP-C Family Medicine   [address] [place] [address]  [address]  Family Medicine [facility] [address]  [phone]               Nidhi Gandhi MD  08/31/23 8265

## 2023-08-31 NOTE — Clinical Note
"Ramos Nietor" Antonia was seen and treated in our emergency department on 8/31/2023.  He may return to work on 09/05/2023.       If you have any questions or concerns, please don't hesitate to call.      Nidhi Gandhi MD"

## 2023-09-22 DIAGNOSIS — I10 HYPERTENSION, UNSPECIFIED TYPE: ICD-10-CM

## 2023-09-22 DIAGNOSIS — E66.01 CLASS 3 SEVERE OBESITY DUE TO EXCESS CALORIES WITHOUT SERIOUS COMORBIDITY WITH BODY MASS INDEX (BMI) OF 60.0 TO 69.9 IN ADULT: ICD-10-CM

## 2023-09-22 DIAGNOSIS — E87.6 HYPOKALEMIA: Primary | ICD-10-CM

## 2023-09-22 DIAGNOSIS — R60.9 EDEMA, UNSPECIFIED TYPE: ICD-10-CM

## 2023-09-22 RX ORDER — TORSEMIDE 20 MG/1
20 TABLET ORAL DAILY
Qty: 30 TABLET | Refills: 0 | Status: SHIPPED | OUTPATIENT
Start: 2023-09-22 | End: 2023-10-18 | Stop reason: SDUPTHER

## 2023-09-22 RX ORDER — METOPROLOL SUCCINATE 25 MG/1
25 TABLET, EXTENDED RELEASE ORAL DAILY
Qty: 30 TABLET | Refills: 0 | Status: SHIPPED | OUTPATIENT
Start: 2023-09-22 | End: 2023-09-27 | Stop reason: SDUPTHER

## 2023-09-22 RX ORDER — POTASSIUM CHLORIDE 750 MG/1
10 TABLET, EXTENDED RELEASE ORAL DAILY
Qty: 30 TABLET | Refills: 0 | Status: SHIPPED | OUTPATIENT
Start: 2023-09-22 | End: 2023-10-18 | Stop reason: SDUPTHER

## 2023-09-22 RX ORDER — LOSARTAN POTASSIUM 100 MG/1
100 TABLET ORAL DAILY
Qty: 90 TABLET | Refills: 3 | Status: SHIPPED | OUTPATIENT
Start: 2023-09-22 | End: 2023-10-18 | Stop reason: SDUPTHER

## 2023-09-22 RX ORDER — LIRAGLUTIDE 6 MG/ML
INJECTION SUBCUTANEOUS
Qty: 6.3 ML | Refills: 0 | Status: SHIPPED | OUTPATIENT
Start: 2023-09-22 | End: 2023-10-18 | Stop reason: SDUPTHER

## 2023-09-27 ENCOUNTER — OFFICE VISIT (OUTPATIENT)
Dept: FAMILY MEDICINE | Facility: CLINIC | Age: 28
End: 2023-09-27
Payer: COMMERCIAL

## 2023-09-27 VITALS
WEIGHT: 315 LBS | SYSTOLIC BLOOD PRESSURE: 136 MMHG | BODY MASS INDEX: 50.62 KG/M2 | TEMPERATURE: 98 F | DIASTOLIC BLOOD PRESSURE: 82 MMHG | OXYGEN SATURATION: 94 % | HEART RATE: 89 BPM | HEIGHT: 66 IN

## 2023-09-27 DIAGNOSIS — E66.01 CLASS 3 SEVERE OBESITY DUE TO EXCESS CALORIES WITHOUT SERIOUS COMORBIDITY WITH BODY MASS INDEX (BMI) OF 60.0 TO 69.9 IN ADULT: ICD-10-CM

## 2023-09-27 DIAGNOSIS — I10 HYPERTENSION, UNSPECIFIED TYPE: Primary | ICD-10-CM

## 2023-09-27 PROBLEM — R05.9 COUGH: Status: RESOLVED | Noted: 2023-02-13 | Resolved: 2023-09-27

## 2023-09-27 LAB
ANION GAP SERPL CALC-SCNC: 7 MEQ/L (ref 2–13)
BUN SERPL-MCNC: 14 MG/DL (ref 7–20)
CALCIUM SERPL-MCNC: 9.3 MG/DL (ref 8.4–10.2)
CHLORIDE SERPL-SCNC: 99 MMOL/L (ref 98–110)
CO2 SERPL-SCNC: 34 MMOL/L (ref 21–32)
CREAT SERPL-MCNC: 0.74 MG/DL (ref 0.66–1.25)
CREAT/UREA NIT SERPL: 19 (ref 12–20)
GFR SERPLBLD CREATININE-BSD FMLA CKD-EPI: >90 MLS/MIN/1.73/M2
GLUCOSE SERPL-MCNC: 91 MG/DL (ref 70–115)
POTASSIUM SERPL-SCNC: 3.8 MMOL/L (ref 3.5–5.1)
SODIUM SERPL-SCNC: 140 MMOL/L (ref 135–145)

## 2023-09-27 PROCEDURE — 99213 OFFICE O/P EST LOW 20 MIN: CPT | Mod: ,,, | Performed by: NURSE PRACTITIONER

## 2023-09-27 PROCEDURE — 99213 PR OFFICE/OUTPT VISIT, EST, LEVL III, 20-29 MIN: ICD-10-PCS | Mod: ,,, | Performed by: NURSE PRACTITIONER

## 2023-09-27 PROCEDURE — 80048 BASIC METABOLIC PNL TOTAL CA: CPT | Performed by: NURSE PRACTITIONER

## 2023-09-27 RX ORDER — PEN NEEDLE, DIABETIC 32GX 5/32"
1 NEEDLE, DISPOSABLE MISCELLANEOUS DAILY
COMMUNITY
Start: 2023-08-21

## 2023-09-27 RX ORDER — METOPROLOL SUCCINATE 25 MG/1
50 TABLET, EXTENDED RELEASE ORAL DAILY
Qty: 90 TABLET | Refills: 3 | Status: SHIPPED | OUTPATIENT
Start: 2023-09-27 | End: 2023-10-18 | Stop reason: SDUPTHER

## 2023-09-27 NOTE — PROGRESS NOTES
Patient ID: [id number]     Chief Complaint: Hypertension (Follow up)      HPI:     Ramos Knight is a 28 y.o. male in the office following up on blood pressure.  He's lost 17 lbs since his last office visit.  He's no longer exercising but still restricting sodium.  He is eating smaller portions.  He is compliant with his blood pressure medication.  He is taking Victoza 1.8 mg daily.  He reports that the swelling in his feet has nearly resolved.  He is not wheezing or coughing.  He does not feel short of breath.  Denies CP. He has not been able to acquire any type of insurance and has not seen a cardiologist.  Is currently looking for a new job and as soon as he has insurance he will notify this office. He quit sleeping with borrowed CPAP and does not feel like he needs it anymore. He was exposed to aqua ammonia recently which did cause his chest to feel tight but this has resolved. He went to the ER for this a few weeks ago.     Past Medical History:  has a past medical history of Broken ankle, CHF (congestive heart failure), and Hypertension.    Social History:  reports that he has never smoked. He has never been exposed to tobacco smoke. He has never used smokeless tobacco. He reports current alcohol use of about 24.0 standard drinks of alcohol per week. He reports that he does not use drugs.    Current Outpatient Medications   Medication Instructions    albuterol (PROVENTIL/VENTOLIN HFA) 90 mcg/actuation inhaler 2 puffs, Inhalation, Every 6 hours PRN    hydroCHLOROthiazide (HYDRODIURIL) 12.5 mg, Oral, Daily    liraglutide 0.6 mg/0.1 mL, 18 mg/3 mL, subq PNIJ (VICTOZA 3-YAMILE) 0.6 mg/0.1 mL (18 mg/3 mL) PnIj pen Inject 0.6 mg into the skin once daily for 7 days, THEN 1.2 mg once daily for 7 days, THEN 1.8 mg once daily for 14 days.    losartan (COZAAR) 100 mg, Oral, Daily    metoprolol succinate (TOPROL-XL) 50 mg, Oral, Daily    naproxen (NAPROSYN) 500 mg, Oral, 2 times daily    potassium chloride SA  "(K-DUR,KLOR-CON M) 10 MEQ tablet 10 mEq, Oral, Daily    SURE COMFORT PEN NEEDLE 32 gauge x 5/32" Ndle 1 each, Subcutaneous, Daily    torsemide (DEMADEX) 20 mg, Oral, Daily       Patient is allergic to lisinopril.     Patient Care Team:  Candie Knight FNP-C as PCP - General (Family Medicine)       Subjective     Review of Systems    See HPI     Objective     Visit Vitals  /82 (BP Location: Left arm, Patient Position: Sitting, BP Method: Large (Manual))   Pulse 89   Temp 97.9 °F (36.6 °C) (Temporal)   Ht 5' 6" (1.676 m)   Wt (!) 180 kg (396 lb 13.3 oz)   SpO2 (!) 94%   BMI 64.05 kg/m²       Physical Exam  Vitals reviewed.   Constitutional:       General: He is not in acute distress.     Appearance: Normal appearance. He is obese. He is not ill-appearing.   Cardiovascular:      Rate and Rhythm: Normal rate and regular rhythm.      Heart sounds: Normal heart sounds.   Pulmonary:      Effort: Pulmonary effort is normal. No respiratory distress.      Breath sounds: Normal breath sounds. No wheezing or rhonchi.   Musculoskeletal:      Right lower leg: No edema.      Left lower leg: No edema.   Skin:     General: Skin is warm and dry.   Neurological:      Mental Status: He is alert and oriented to person, place, and time.   Psychiatric:         Mood and Affect: Mood normal.         Behavior: Behavior normal.         Thought Content: Thought content normal.         Assessment:       ICD-10-CM ICD-9-CM   1. Hypertension, unspecified type  I10 401.9   2. Class 3 severe obesity due to excess calories without serious comorbidity with body mass index (BMI) of 60.0 to 69.9 in adult  E66.01 278.01    Z68.44 V85.44        Plan:     Increase metoprolol to 50 mg daily  Continue all other medications without change      Follow up in about 3 months (around 12/27/2023) for Routine, non-fasting labs prior. In addition to their next scheduled appointment, the patient has also been instructed to follow up on as needed basis. "     Future Appointments   Date Time Provider Department Center   10/4/2023  2:30 PM Candie Knight FNP-C [facility]   12/22/2023  9:30 AM LAB, [facility]   12/28/2023 11:30 AM Candie Knight FNP-C [facility]

## 2023-09-29 ENCOUNTER — TELEPHONE (OUTPATIENT)
Dept: FAMILY MEDICINE | Facility: CLINIC | Age: 28
End: 2023-09-29
Payer: COMMERCIAL

## 2023-09-29 NOTE — TELEPHONE ENCOUNTER
Pt verbalized understanding    ----- Message from MARIAELENA Dimas sent at 9/27/2023  5:14 PM CDT -----  Results are within acceptable ranges for age and conditions. No change in treatment needed at this time. Follow up as scheduled.

## 2023-10-18 DIAGNOSIS — E87.6 HYPOKALEMIA: ICD-10-CM

## 2023-10-18 DIAGNOSIS — E66.01 CLASS 3 SEVERE OBESITY DUE TO EXCESS CALORIES WITHOUT SERIOUS COMORBIDITY WITH BODY MASS INDEX (BMI) OF 60.0 TO 69.9 IN ADULT: ICD-10-CM

## 2023-10-18 DIAGNOSIS — I10 HYPERTENSION, UNSPECIFIED TYPE: ICD-10-CM

## 2023-10-18 DIAGNOSIS — R60.9 EDEMA, UNSPECIFIED TYPE: ICD-10-CM

## 2023-10-18 RX ORDER — LIRAGLUTIDE 6 MG/ML
INJECTION SUBCUTANEOUS
Qty: 6.3 ML | Refills: 0 | Status: SHIPPED | OUTPATIENT
Start: 2023-10-18 | End: 2023-11-21 | Stop reason: SDUPTHER

## 2023-10-18 RX ORDER — POTASSIUM CHLORIDE 750 MG/1
10 TABLET, EXTENDED RELEASE ORAL DAILY
Qty: 30 TABLET | Refills: 0 | Status: SHIPPED | OUTPATIENT
Start: 2023-10-18 | End: 2023-11-21 | Stop reason: SDUPTHER

## 2023-10-18 RX ORDER — METOPROLOL SUCCINATE 25 MG/1
50 TABLET, EXTENDED RELEASE ORAL DAILY
Qty: 90 TABLET | Refills: 3 | Status: SHIPPED | OUTPATIENT
Start: 2023-10-18 | End: 2023-11-21 | Stop reason: SDUPTHER

## 2023-10-18 RX ORDER — LOSARTAN POTASSIUM 100 MG/1
100 TABLET ORAL DAILY
Qty: 90 TABLET | Refills: 3 | Status: SHIPPED | OUTPATIENT
Start: 2023-10-18 | End: 2023-11-21 | Stop reason: SDUPTHER

## 2023-10-18 RX ORDER — HYDROCHLOROTHIAZIDE 12.5 MG/1
12.5 TABLET ORAL DAILY
Qty: 30 TABLET | Refills: 0 | Status: SHIPPED | OUTPATIENT
Start: 2023-10-18 | End: 2023-11-21 | Stop reason: SDUPTHER

## 2023-10-18 RX ORDER — TORSEMIDE 20 MG/1
20 TABLET ORAL DAILY
Qty: 30 TABLET | Refills: 0 | Status: SHIPPED | OUTPATIENT
Start: 2023-10-18 | End: 2023-11-21 | Stop reason: SDUPTHER

## 2023-11-21 DIAGNOSIS — R60.9 EDEMA, UNSPECIFIED TYPE: ICD-10-CM

## 2023-11-21 DIAGNOSIS — I10 HYPERTENSION, UNSPECIFIED TYPE: ICD-10-CM

## 2023-11-21 DIAGNOSIS — E66.01 CLASS 3 SEVERE OBESITY DUE TO EXCESS CALORIES WITHOUT SERIOUS COMORBIDITY WITH BODY MASS INDEX (BMI) OF 60.0 TO 69.9 IN ADULT: ICD-10-CM

## 2023-11-21 DIAGNOSIS — E87.6 HYPOKALEMIA: ICD-10-CM

## 2023-11-21 RX ORDER — POTASSIUM CHLORIDE 750 MG/1
10 TABLET, EXTENDED RELEASE ORAL DAILY
Qty: 30 TABLET | Refills: 0 | Status: SHIPPED | OUTPATIENT
Start: 2023-11-21 | End: 2024-01-23

## 2023-11-21 RX ORDER — METOPROLOL SUCCINATE 25 MG/1
50 TABLET, EXTENDED RELEASE ORAL DAILY
Qty: 90 TABLET | Refills: 3 | Status: SHIPPED | OUTPATIENT
Start: 2023-11-21 | End: 2024-01-23

## 2023-11-21 RX ORDER — HYDROCHLOROTHIAZIDE 12.5 MG/1
12.5 TABLET ORAL DAILY
Qty: 30 TABLET | Refills: 0 | Status: SHIPPED | OUTPATIENT
Start: 2023-11-21 | End: 2024-02-23 | Stop reason: SDUPTHER

## 2023-11-21 RX ORDER — LOSARTAN POTASSIUM 100 MG/1
100 TABLET ORAL DAILY
Qty: 90 TABLET | Refills: 3 | Status: SHIPPED | OUTPATIENT
Start: 2023-11-21 | End: 2024-01-02 | Stop reason: ALTCHOICE

## 2023-11-21 RX ORDER — LIRAGLUTIDE 6 MG/ML
INJECTION SUBCUTANEOUS
Qty: 6.3 ML | Refills: 0 | Status: SHIPPED | OUTPATIENT
Start: 2023-11-21 | End: 2024-01-02 | Stop reason: SDUPTHER

## 2023-11-21 RX ORDER — TORSEMIDE 20 MG/1
20 TABLET ORAL DAILY
Qty: 30 TABLET | Refills: 0 | Status: SHIPPED | OUTPATIENT
Start: 2023-11-21 | End: 2024-03-05

## 2023-12-20 ENCOUNTER — HOSPITAL ENCOUNTER (EMERGENCY)
Facility: HOSPITAL | Age: 28
Discharge: HOME | End: 2023-12-20
Payer: COMMERCIAL

## 2023-12-20 VITALS
WEIGHT: 315 LBS | HEART RATE: 84 BPM | TEMPERATURE: 98 F | RESPIRATION RATE: 20 BRPM | DIASTOLIC BLOOD PRESSURE: 53 MMHG | OXYGEN SATURATION: 96 % | BODY MASS INDEX: 50.62 KG/M2 | SYSTOLIC BLOOD PRESSURE: 168 MMHG | HEIGHT: 66 IN

## 2023-12-20 DIAGNOSIS — R11.0 NAUSEA: ICD-10-CM

## 2023-12-20 DIAGNOSIS — S39.012A STRAIN OF LUMBAR REGION, INITIAL ENCOUNTER: Primary | ICD-10-CM

## 2023-12-20 PROCEDURE — 63600175 PHARM REV CODE 636 W HCPCS: Performed by: NURSE PRACTITIONER

## 2023-12-20 PROCEDURE — 25000003 PHARM REV CODE 250: Performed by: NURSE PRACTITIONER

## 2023-12-20 PROCEDURE — 96372 THER/PROPH/DIAG INJ SC/IM: CPT | Performed by: NURSE PRACTITIONER

## 2023-12-20 PROCEDURE — 99284 EMERGENCY DEPT VISIT MOD MDM: CPT

## 2023-12-20 RX ORDER — ONDANSETRON 4 MG/1
4 TABLET, ORALLY DISINTEGRATING ORAL
Status: COMPLETED | OUTPATIENT
Start: 2023-12-20 | End: 2023-12-20

## 2023-12-20 RX ORDER — ONDANSETRON 4 MG/1
4 TABLET, FILM COATED ORAL EVERY 6 HOURS
Qty: 12 TABLET | Refills: 0 | Status: SHIPPED | OUTPATIENT
Start: 2023-12-20 | End: 2024-03-05

## 2023-12-20 RX ORDER — KETOROLAC TROMETHAMINE 30 MG/ML
60 INJECTION, SOLUTION INTRAMUSCULAR; INTRAVENOUS
Status: COMPLETED | OUTPATIENT
Start: 2023-12-20 | End: 2023-12-20

## 2023-12-20 RX ORDER — CYCLOBENZAPRINE HCL 10 MG
10 TABLET ORAL 3 TIMES DAILY PRN
Qty: 15 TABLET | Refills: 0 | Status: SHIPPED | OUTPATIENT
Start: 2023-12-20 | End: 2023-12-25

## 2023-12-20 RX ADMIN — KETOROLAC TROMETHAMINE 60 MG: 30 INJECTION, SOLUTION INTRAMUSCULAR; INTRAVENOUS at 05:12

## 2023-12-20 RX ADMIN — ONDANSETRON 4 MG: 4 TABLET, ORALLY DISINTEGRATING ORAL at 05:12

## 2023-12-20 NOTE — Clinical Note
"Ramos Nietor"Antonia was seen and treated in our emergency department on 12/20/2023.  He may return to work on 12/22/2023.       If you have any questions or concerns, please don't hesitate to call.      Rosanna Bowens, LIANEP"

## 2023-12-20 NOTE — ED PROVIDER NOTES
Encounter Date: 12/20/2023       History     Chief Complaint   Patient presents with    Nausea    Back Pain     Pt reports mid lower back pain starting Saturday after heavy lifting at work. Pt also c/o nausea starting yesterday. Pt denies V/D. Pt denies Burning/pain with urination. Pt denies all other complaints. Pt capillary refill <3. Pt acyanotic. No acute distress noted.         Pt reports mid lower back pain starting Saturday after heavy lifting at work. Pt also c/o nausea starting yesterday. Pt denies V/D. Pt denies Burning/pain with urination. Pt denies all other complaints. Pt capillary refill <3. Pt acyanotic. No acute distress noted.             Review of patient's allergies indicates:   Allergen Reactions    Lisinopril      Past Medical History:   Diagnosis Date    Broken ankle     CHF (congestive heart failure)     Hypertension      Past Surgical History:   Procedure Laterality Date    ANKLE SURGERY      HTN      hypertension       Family History   Problem Relation Age of Onset    Diabetes Mother     Breast cancer Mother      Social History     Tobacco Use    Smoking status: Never     Passive exposure: Never    Smokeless tobacco: Never   Substance Use Topics    Alcohol use: Yes     Alcohol/week: 24.0 standard drinks of alcohol     Types: 24 Cans of beer per week     Comment: ON WEEKENDS    Drug use: Never     Review of Systems   Gastrointestinal:  Positive for nausea.   Musculoskeletal:  Positive for back pain.   All other systems reviewed and are negative.      Physical Exam     Initial Vitals [12/20/23 1703]   BP Pulse Resp Temp SpO2   (!) 168/53 84 20 98.1 °F (36.7 °C) 96 %      MAP       --         Physical Exam    Nursing note and vitals reviewed.  Constitutional: He appears well-developed.   Morbidly obese   HENT:   Head: Normocephalic and atraumatic.   Mouth/Throat: Mucous membranes are normal.   Eyes: Pupils are equal, round, and reactive to light.   Neck:   Normal range of  motion.  Cardiovascular:  Normal rate, regular rhythm and normal heart sounds.           Pulmonary/Chest: Breath sounds normal.   Musculoskeletal:         General: Normal range of motion.      Cervical back: Normal range of motion.      Comments: Rt lumbar tenderness     Neurological: He is alert and oriented to person, place, and time.   Skin: Skin is warm and dry. Capillary refill takes less than 2 seconds.   Psychiatric: He has a normal mood and affect. His behavior is normal. Judgment and thought content normal.         ED Course   Procedures  Labs Reviewed - No data to display       Imaging Results    None          Medications   ondansetron disintegrating tablet 4 mg (4 mg Oral Given 12/20/23 1737)   ketorolac injection 60 mg (60 mg Intramuscular Given 12/20/23 1737)     Medical Decision Making  Problems Addressed:  Nausea: acute illness or injury  Strain of lumbar region, initial encounter: acute illness or injury    Risk  Prescription drug management.                                      Clinical Impression:  Final diagnoses:  [S39.012A] Strain of lumbar region, initial encounter (Primary)  [R11.0] Nausea          ED Disposition Condition    Discharge Stable          ED Prescriptions       Medication Sig Dispense Start Date End Date Auth. Provider    ondansetron (ZOFRAN) 4 MG tablet Take 1 tablet (4 mg total) by mouth every 6 (six) hours. 12 tablet 12/20/2023 -- Rosanna Bowens FNP    cyclobenzaprine (FLEXERIL) 10 MG tablet Take 1 tablet (10 mg total) by mouth 3 (three) times daily as needed for Muscle spasms. 15 tablet 12/20/2023 12/25/2023 Rosanna Bowens FNP          Follow-up Information       Follow up With Specialties Details Why Contact Info    Candie Knight FNP-KIT Family Medicine Call  As needed [address] [facility] [facility] [address]  [address]  Family Medicine Clinic  [facility] [address]  [phone]               Rosanna Bowens FNP  12/20/23 6622

## 2024-01-02 ENCOUNTER — OFFICE VISIT (OUTPATIENT)
Dept: FAMILY MEDICINE | Facility: CLINIC | Age: 29
End: 2024-01-02
Payer: COMMERCIAL

## 2024-01-02 VITALS
HEIGHT: 66 IN | WEIGHT: 315 LBS | BODY MASS INDEX: 50.62 KG/M2 | DIASTOLIC BLOOD PRESSURE: 72 MMHG | SYSTOLIC BLOOD PRESSURE: 150 MMHG | HEART RATE: 83 BPM | TEMPERATURE: 99 F | OXYGEN SATURATION: 97 %

## 2024-01-02 DIAGNOSIS — F33.1 MODERATE EPISODE OF RECURRENT MAJOR DEPRESSIVE DISORDER: ICD-10-CM

## 2024-01-02 DIAGNOSIS — E66.01 CLASS 3 SEVERE OBESITY DUE TO EXCESS CALORIES WITHOUT SERIOUS COMORBIDITY WITH BODY MASS INDEX (BMI) OF 60.0 TO 69.9 IN ADULT: ICD-10-CM

## 2024-01-02 DIAGNOSIS — I10 PRIMARY HYPERTENSION: Primary | ICD-10-CM

## 2024-01-02 DIAGNOSIS — F41.9 ANXIETY: ICD-10-CM

## 2024-01-02 DIAGNOSIS — E87.6 HYPOKALEMIA: ICD-10-CM

## 2024-01-02 DIAGNOSIS — R60.0 PERIPHERAL EDEMA: ICD-10-CM

## 2024-01-02 DIAGNOSIS — G47.33 OSA (OBSTRUCTIVE SLEEP APNEA): ICD-10-CM

## 2024-01-02 DIAGNOSIS — R06.02 SOB (SHORTNESS OF BREATH): ICD-10-CM

## 2024-01-02 PROBLEM — R11.0 NAUSEA: Status: RESOLVED | Noted: 2023-12-20 | Resolved: 2024-01-02

## 2024-01-02 PROBLEM — S39.012A STRAIN OF LUMBAR REGION: Status: RESOLVED | Noted: 2023-12-20 | Resolved: 2024-01-02

## 2024-01-02 PROCEDURE — 1160F RVW MEDS BY RX/DR IN RCRD: CPT | Mod: CPTII,,, | Performed by: NURSE PRACTITIONER

## 2024-01-02 PROCEDURE — 3077F SYST BP >= 140 MM HG: CPT | Mod: CPTII,,, | Performed by: NURSE PRACTITIONER

## 2024-01-02 PROCEDURE — 1159F MED LIST DOCD IN RCRD: CPT | Mod: CPTII,,, | Performed by: NURSE PRACTITIONER

## 2024-01-02 PROCEDURE — 99214 OFFICE O/P EST MOD 30 MIN: CPT | Mod: ,,, | Performed by: NURSE PRACTITIONER

## 2024-01-02 PROCEDURE — 3078F DIAST BP <80 MM HG: CPT | Mod: CPTII,,, | Performed by: NURSE PRACTITIONER

## 2024-01-02 PROCEDURE — 4010F ACE/ARB THERAPY RXD/TAKEN: CPT | Mod: CPTII,,, | Performed by: NURSE PRACTITIONER

## 2024-01-02 PROCEDURE — 3008F BODY MASS INDEX DOCD: CPT | Mod: CPTII,,, | Performed by: NURSE PRACTITIONER

## 2024-01-02 RX ORDER — LIRAGLUTIDE 6 MG/ML
1.8 INJECTION SUBCUTANEOUS DAILY
Qty: 9 ML | Refills: 0 | Status: SHIPPED | OUTPATIENT
Start: 2024-01-02 | End: 2024-01-23 | Stop reason: SDUPTHER

## 2024-01-02 RX ORDER — VALSARTAN 160 MG/1
160 TABLET ORAL DAILY
Qty: 90 TABLET | Refills: 0 | Status: SHIPPED | OUTPATIENT
Start: 2024-01-02

## 2024-01-02 RX ORDER — FLUOXETINE HYDROCHLORIDE 20 MG/1
20 CAPSULE ORAL DAILY
Qty: 30 CAPSULE | Refills: 1 | Status: SHIPPED | OUTPATIENT
Start: 2024-01-02 | End: 2024-01-23 | Stop reason: SDUPTHER

## 2024-01-02 NOTE — ASSESSMENT & PLAN NOTE
BP not controlled.  Stop losartan, start valsartan 160 mg.  Continue hctz, torsemide, metoprolol.   Low Sodium Diet (DASH Diet - Less than 2 grams of sodium per day).  Monitor blood pressure daily and log. Report consistent numbers greater than 140/90.  Maintain healthy weight with goal BMI <30. Exercise 30 minutes per day, 5 days per week.  Smoking cessation encouraged to aid in BP reduction.

## 2024-01-02 NOTE — ASSESSMENT & PLAN NOTE
Start Prozac.  Need to controll depression before treating ADHD.  I do not believe prescribing a stimulant is in his best interest and will not do so unless ok'd by cardiology.  We discussed several non stimulant options. RTC 3 weeks for follow up.  Safety plan in place.

## 2024-01-02 NOTE — ASSESSMENT & PLAN NOTE
Weight increased.  Continue liragultide.  Body mass index is 67.5 kg/m².  Goal BMI <30.  Exercise 5 times a week for 30 minutes per day.  Drink a minimum of 64 ounces of water a day.  Avoid soda, simple sugars, excessive rice, potatoes, and bread. Limit fast foods and fried foods.  Choose complex carbs in moderation (example: green vegetables, beans, oatmeal). Eat plenty of fresh fruits and vegetables with lean meats daily.  Do not skip meals. Encouraged smaller portion sizes.  Avoid fad diets. Consider implementing sustainable healthy lifestyle changes.

## 2024-01-02 NOTE — PROGRESS NOTES
Patient ID: [id number]     Chief Complaint: Follow-up      HPI:     Ramos Knight is a 28 y.o. male here today for Follow-up    He's following up on high blood pressure and sob.  Labs collected last week, those are reviewed with him today.  He has insurance now and wishes to proceed with cardiology referral.  He's still having issues with swelling in his legs intermittenly.  Does notice a coorlation betweein salt intake and swelling.  He reports SOB is less now than it was several months ago.  He has been compliant with blood pressure medications.  Was sleeping with someone elses CPAP but quit.  States he's sleeping fine, doesn't think he needs it.  He does have several risk factors for GOLDEN. Archie 11.  He has difficulty focusing and falls asleep easily during the day. He'd like to restart adderall or vyvanse for his ADHD, he can't concentrate at work.  He's gained 22 lbs since his last office visit on 9/27/23.  He reports that he's had severe depression and believes this contributed to his weight gain.  PHQ9 18, GAD7 18.      Past Medical History:  has a past medical history of Broken ankle, CHF (congestive heart failure), and Hypertension.    Surgical History:  has a past surgical history that includes Ankle surgery; HTN; and hypertension.    Family History: family history includes Breast cancer in his mother; Diabetes in his mother.    Social History:  reports that he has never smoked. He has never been exposed to tobacco smoke. He has never used smokeless tobacco. He reports current alcohol use of about 24.0 standard drinks of alcohol per week. He reports that he does not use drugs.    Current Outpatient Medications   Medication Instructions    albuterol (PROVENTIL/VENTOLIN HFA) 90 mcg/actuation inhaler 2 puffs, Inhalation, Every 6 hours PRN    FLUoxetine 20 mg, Oral, Daily    hydroCHLOROthiazide (HYDRODIURIL) 12.5 mg, Oral, Daily    metoprolol succinate (TOPROL-XL) 50 mg, Oral, Daily    naproxen (NAPROSYN) 500  "mg, Oral, 2 times daily    ondansetron (ZOFRAN) 4 mg, Oral, Every 6 hours    potassium chloride SA (K-DUR,KLOR-CON M) 10 MEQ tablet 10 mEq, Oral, Daily    SURE COMFORT PEN NEEDLE 32 gauge x 5/32" Ndle 1 each, Subcutaneous, Daily    torsemide (DEMADEX) 20 mg, Oral, Daily    valsartan (DIOVAN) 160 mg, Oral, Daily    VICTOZA 3-YAMILE 1.8 mg, Subcutaneous, Daily       Patient is allergic to lisinopril.     Patient Care Team:  Candie Knight FNP-C as PCP - General (Family Medicine)       Subjective:     Review of Systems    See HPI     Objective:     Visit Vitals  BP (!) 150/72 (BP Location: Left arm, Patient Position: Sitting)   Pulse 83   Temp 99.4 °F (37.4 °C) (Oral)   Ht 5' 5.98" (1.676 m)   Wt (!) 189.6 kg (418 lb)   SpO2 97%   BMI 67.50 kg/m²       Physical Exam  Vitals reviewed.   Constitutional:       Appearance: Normal appearance. He is morbidly obese.   Cardiovascular:      Rate and Rhythm: Normal rate and regular rhythm.      Heart sounds: Normal heart sounds.   Pulmonary:      Effort: Pulmonary effort is normal. No tachypnea or respiratory distress.      Breath sounds: Decreased breath sounds present.      Comments: Noisy breathing  Skin:     General: Skin is warm and dry.      Capillary Refill: Capillary refill takes less than 2 seconds.   Neurological:      Mental Status: He is alert and oriented to person, place, and time.      Gait: Gait abnormal.   Psychiatric:         Mood and Affect: Mood is depressed.         Labs Reviewed:     See scanned image.     Assessment & Plan:     1. Primary hypertension  Overview:  On hctz 12.5 mg daily, losartan 100 mg daily, metoprolol 25 mg daily, torsemide 20 mg daily.     Assessment & Plan:  BP not controlled.  Stop losartan, start valsartan 160 mg.  Continue hctz, torsemide, metoprolol.   Low Sodium Diet (DASH Diet - Less than 2 grams of sodium per day).  Monitor blood pressure daily and log. Report consistent numbers greater than 140/90.  Maintain healthy weight with " goal BMI <30. Exercise 30 minutes per day, 5 days per week.  Smoking cessation encouraged to aid in BP reduction.       Orders:  -     Ambulatory referral/consult to Cardiology; Future; Expected date: 01/09/2024  -     valsartan (DIOVAN) 160 MG tablet; Take 1 tablet (160 mg total) by mouth once daily.  Dispense: 90 tablet; Refill: 0    2. Class 3 severe obesity due to excess calories without serious comorbidity with body mass index (BMI) of 60.0 to 69.9 in adult  Overview:  Started Victoza, had lost some weight, gained it back.  States that he didn't stop Victoza. Doesn't think it's working.     Assessment & Plan:  Weight increased.  Continue liragultide.  Body mass index is 67.5 kg/m².  Goal BMI <30.  Exercise 5 times a week for 30 minutes per day.  Drink a minimum of 64 ounces of water a day.  Avoid soda, simple sugars, excessive rice, potatoes, and bread. Limit fast foods and fried foods.  Choose complex carbs in moderation (example: green vegetables, beans, oatmeal). Eat plenty of fresh fruits and vegetables with lean meats daily.  Do not skip meals. Encouraged smaller portion sizes.  Avoid fad diets. Consider implementing sustainable healthy lifestyle changes.       Orders:  -     liraglutide 0.6 mg/0.1 mL, 18 mg/3 mL, subq PNIJ (VICTOZA 3-YAMILE) 0.6 mg/0.1 mL (18 mg/3 mL) PnIj pen; Inject 1.8 mg into the skin once daily.  Dispense: 9 mL; Refill: 0    3. Peripheral edema  Assessment & Plan:  Stable.  Continue hctz, torsemide.        4. Hypokalemia  Overview:  On K-Dur 10 meq daily.    Assessment & Plan:  K stable, continue K-Dur 10 meq daily.      5. GOLDEN (obstructive sleep apnea)  Comments:  needs home sleep study.  Orders:  -     Ambulatory referral/consult to Sleep Disorders; Future; Expected date: 01/09/2024    6. SOB (shortness of breath)  Comments:  needs cardiac workup, referring to [facility]  Orders:  -     Ambulatory referral/consult to Cardiology; Future; Expected date: 01/09/2024    7. Moderate episode of  recurrent major depressive disorder  Overview:  Has been a problem intermittently for a couple years. 1/2/24- PHQ9: 18, GAD7:18   Denies ever being treated for this.  Has a history of ADHD and off stimulants for many years.  Requesting to restart.     Assessment & Plan:  Start Prozac.  Need to controll depression before treating ADHD.  I do not believe prescribing a stimulant is in his best interest and will not do so unless ok'd by cardiology.  We discussed several non stimulant options. RTC 3 weeks for follow up.  Safety plan in place.     Orders:  -     FLUoxetine 20 MG capsule; Take 1 capsule (20 mg total) by mouth once daily.  Dispense: 30 capsule; Refill: 1    8. Anxiety  -     FLUoxetine 20 MG capsule; Take 1 capsule (20 mg total) by mouth once daily.  Dispense: 30 capsule; Refill: 1       Follow up for 1), 3 wk f/u, HTN, Obesity. In addition to their scheduled follow up, the patient has also been instructed to follow up on as needed basis.     Future Appointments   Date Time Provider Department Center   1/23/2024  7:15 AM Candie Knight, FNP-C [facility]

## 2024-01-19 ENCOUNTER — HOSPITAL ENCOUNTER (OUTPATIENT)
Dept: RADIOLOGY | Facility: HOSPITAL | Age: 29
Discharge: HOME | End: 2024-01-19
Attending: INTERNAL MEDICINE
Payer: COMMERCIAL

## 2024-01-19 ENCOUNTER — HOSPITAL ENCOUNTER (OUTPATIENT)
Dept: CARDIOLOGY | Facility: HOSPITAL | Age: 29
Discharge: HOME | End: 2024-01-19
Attending: INTERNAL MEDICINE
Payer: COMMERCIAL

## 2024-01-19 VITALS — BODY MASS INDEX: 66.37 KG/M2 | WEIGHT: 315 LBS

## 2024-01-19 DIAGNOSIS — R07.89 OTHER CHEST PAIN: ICD-10-CM

## 2024-01-19 LAB
CV STRESS BASE HR: 68 BPM
DIASTOLIC BLOOD PRESSURE: 82 MMHG
EJECTION FRACTION- HIGH: 65 %
END DIASTOLIC INDEX-HIGH: 158 ML/M2
END DIASTOLIC INDEX-LOW: 94 ML/M2
END SYSTOLIC INDEX-HIGH: 71 ML/M2
END SYSTOLIC INDEX-LOW: 33 ML/M2
NUC REST DIASTOLIC VOLUME INDEX: 173
NUC REST EJECTION FRACTION: 53
NUC REST SYSTOLIC VOLUME INDEX: 82
NUC STRESS DIASTOLIC VOLUME INDEX: 147
NUC STRESS EJECTION FRACTION: 62 %
NUC STRESS SYSTOLIC VOLUME INDEX: 56
OHS CV CPX 85 PERCENT MAX PREDICTED HEART RATE MALE: 164
OHS CV CPX MAX PREDICTED HEART RATE: 193
OHS CV CPX PATIENT IS FEMALE: 0
OHS CV CPX PATIENT IS MALE: 1
OHS CV CPX RATE PRESSURE PRODUCT PRESENTING: NORMAL
RETIRED EF AND QEF - SEE NOTES: 53 %
SUMMED DIFFERENCE: 0
SUMMED REST SCORE: 0
SUMMED STRESS SCORE: 0
SYSTOLIC BLOOD PRESSURE: 180 MMHG

## 2024-01-19 PROCEDURE — 93017 CV STRESS TEST TRACING ONLY: CPT

## 2024-01-19 PROCEDURE — A9500 TC99M SESTAMIBI: HCPCS

## 2024-01-19 PROCEDURE — 63600175 PHARM REV CODE 636 W HCPCS: Performed by: INTERNAL MEDICINE

## 2024-01-19 RX ORDER — AMINOPHYLLINE 25 MG/ML
25 INJECTION, SOLUTION INTRAVENOUS
Status: DISCONTINUED | OUTPATIENT
Start: 2024-01-19 | End: 2024-01-20 | Stop reason: HOSPADM

## 2024-01-19 RX ORDER — REGADENOSON 0.08 MG/ML
0.4 INJECTION, SOLUTION INTRAVENOUS ONCE
Status: COMPLETED | OUTPATIENT
Start: 2024-01-19 | End: 2024-01-19

## 2024-01-19 RX ADMIN — REGADENOSON 0.4 MG: 0.08 INJECTION, SOLUTION INTRAVENOUS at 09:01

## 2024-01-23 ENCOUNTER — OFFICE VISIT (OUTPATIENT)
Dept: FAMILY MEDICINE | Facility: CLINIC | Age: 29
End: 2024-01-23
Payer: COMMERCIAL

## 2024-01-23 VITALS
BODY MASS INDEX: 50.62 KG/M2 | HEART RATE: 71 BPM | HEIGHT: 66 IN | WEIGHT: 315 LBS | OXYGEN SATURATION: 97 % | SYSTOLIC BLOOD PRESSURE: 142 MMHG | TEMPERATURE: 99 F | DIASTOLIC BLOOD PRESSURE: 82 MMHG

## 2024-01-23 DIAGNOSIS — F41.9 ANXIETY: ICD-10-CM

## 2024-01-23 DIAGNOSIS — E66.01 CLASS 3 SEVERE OBESITY DUE TO EXCESS CALORIES WITHOUT SERIOUS COMORBIDITY WITH BODY MASS INDEX (BMI) OF 60.0 TO 69.9 IN ADULT: ICD-10-CM

## 2024-01-23 DIAGNOSIS — F33.1 MODERATE EPISODE OF RECURRENT MAJOR DEPRESSIVE DISORDER: ICD-10-CM

## 2024-01-23 DIAGNOSIS — E66.01 CLASS 3 SEVERE OBESITY DUE TO EXCESS CALORIES WITHOUT SERIOUS COMORBIDITY WITH BODY MASS INDEX (BMI) OF 60.0 TO 69.9 IN ADULT: Primary | ICD-10-CM

## 2024-01-23 DIAGNOSIS — E87.6 HYPOKALEMIA: ICD-10-CM

## 2024-01-23 DIAGNOSIS — I10 PRIMARY HYPERTENSION: ICD-10-CM

## 2024-01-23 PROCEDURE — 3079F DIAST BP 80-89 MM HG: CPT | Mod: CPTII,,, | Performed by: NURSE PRACTITIONER

## 2024-01-23 PROCEDURE — 3008F BODY MASS INDEX DOCD: CPT | Mod: CPTII,,, | Performed by: NURSE PRACTITIONER

## 2024-01-23 PROCEDURE — 1159F MED LIST DOCD IN RCRD: CPT | Mod: CPTII,,, | Performed by: NURSE PRACTITIONER

## 2024-01-23 PROCEDURE — 3077F SYST BP >= 140 MM HG: CPT | Mod: CPTII,,, | Performed by: NURSE PRACTITIONER

## 2024-01-23 PROCEDURE — 1160F RVW MEDS BY RX/DR IN RCRD: CPT | Mod: CPTII,,, | Performed by: NURSE PRACTITIONER

## 2024-01-23 PROCEDURE — 99214 OFFICE O/P EST MOD 30 MIN: CPT | Mod: ,,, | Performed by: NURSE PRACTITIONER

## 2024-01-23 PROCEDURE — 4010F ACE/ARB THERAPY RXD/TAKEN: CPT | Mod: CPTII,,, | Performed by: NURSE PRACTITIONER

## 2024-01-23 RX ORDER — FLUOXETINE HYDROCHLORIDE 20 MG/1
20 CAPSULE ORAL DAILY
Qty: 90 CAPSULE | Refills: 1 | Status: SHIPPED | OUTPATIENT
Start: 2024-01-23 | End: 2024-01-23 | Stop reason: SDUPTHER

## 2024-01-23 RX ORDER — LIRAGLUTIDE 6 MG/ML
3 INJECTION SUBCUTANEOUS DAILY
Qty: 45 ML | Refills: 3 | Status: SHIPPED | OUTPATIENT
Start: 2024-01-23 | End: 2024-01-23 | Stop reason: SDUPTHER

## 2024-01-23 RX ORDER — LIRAGLUTIDE 6 MG/ML
3 INJECTION SUBCUTANEOUS DAILY
Qty: 45 ML | Refills: 3 | Status: SHIPPED | OUTPATIENT
Start: 2024-01-23 | End: 2024-03-22 | Stop reason: SDUPTHER

## 2024-01-23 RX ORDER — LABETALOL 100 MG/1
100 TABLET, FILM COATED ORAL 2 TIMES DAILY
COMMUNITY
Start: 2024-01-16 | End: 2024-04-04 | Stop reason: SDUPTHER

## 2024-01-23 RX ORDER — FLUOXETINE HYDROCHLORIDE 20 MG/1
20 CAPSULE ORAL DAILY
Qty: 90 CAPSULE | Refills: 1 | Status: SHIPPED | OUTPATIENT
Start: 2024-01-23 | End: 2024-03-22 | Stop reason: SDUPTHER

## 2024-01-23 NOTE — ASSESSMENT & PLAN NOTE
Continue medication without change.  Keep follow up with cardiology.  Encouraged low sodium diet and restart exercise.

## 2024-01-23 NOTE — PROGRESS NOTES
"Patient ID: [id number]     Chief Complaint: Follow-up (Blood pressure and weight loss)      HPI:     Ramos Knight is a 28 y.o. male in the office for Follow-up (Blood pressure and weight loss)    He has establish care with Cardiology since his last office visit here.  A nuclear stress test which he reports was normal.  He is scheduled for an echo next month.  Metoprolol was changed to labetalol twice a day.  He is no longer taking Demadex or potassium supplement because it was not refilled and he thought he was supposed to stop it.  He has been off for about 3 weeks and has not noticed any more swelling than when he was taking it.    He is back on his Victoza at 1.8 mg daily.  He noticed some decreased appetite in the beginning not as much anymore.  He has not yet returned to the gym to exercise because when he gets off of work he is very tired.  He plans to go back to the gym soon.  He is working on getting another job where he will work [employer]. Weight is down 16 lbs in the last month    He completed his home sleep study over the weekend and turned the machine in yesterday.      Past Medical History:  has a past medical history of Broken ankle, CHF (congestive heart failure), and Hypertension.    Social History:  reports that he has never smoked. He has never been exposed to tobacco smoke. He has never used smokeless tobacco. He reports current alcohol use of about 24.0 standard drinks of alcohol per week. He reports that he does not use drugs.    Current Outpatient Medications   Medication Instructions    FLUoxetine 20 mg, Oral, Daily    hydroCHLOROthiazide (HYDRODIURIL) 12.5 mg, Oral, Daily    labetaloL (NORMODYNE) 100 mg, Oral, 2 times daily    naproxen (NAPROSYN) 500 mg, Oral, 2 times daily    ondansetron (ZOFRAN) 4 mg, Oral, Every 6 hours    SURE COMFORT PEN NEEDLE 32 gauge x 5/32" Ndle 1 each, Subcutaneous, Daily    torsemide (DEMADEX) 20 mg, Oral, Daily    valsartan (DIOVAN) 160 mg, Oral, Daily    " "VICTOZA 3-YAMILE 3 mg, Subcutaneous, Daily       Patient is allergic to lisinopril.     Patient Care Team:  Candie Knight FNP-C as PCP - General (Family Medicine)  Rober Love MD as Consulting Physician (Cardiology)     Subjective     Review of Systems    See HPI     Objective     Visit Vitals  BP (!) 142/82 (BP Location: Right arm)   Pulse 71   Temp 98.6 °F (37 °C) (Oral)   Ht 5' 5.98" (1.676 m)   Wt (!) 182.6 kg (402 lb 8 oz)   SpO2 97%   BMI 65.00 kg/m²       Physical Exam  Vitals reviewed.   Constitutional:       Appearance: Normal appearance. He is morbidly obese.   Cardiovascular:      Rate and Rhythm: Normal rate and regular rhythm.      Heart sounds: Normal heart sounds.      Comments: Trace edema ble  Pulmonary:      Effort: Pulmonary effort is normal.      Breath sounds: Normal breath sounds.   Skin:     General: Skin is warm and dry.   Neurological:      Mental Status: He is alert and oriented to person, place, and time.   Psychiatric:         Mood and Affect: Mood normal.         Behavior: Behavior normal.         Assessment & Plan:     1. Class 3 severe obesity due to excess calories without serious comorbidity with body mass index (BMI) of 60.0 to 69.9 in adult  Overview:  On Victoza    Assessment & Plan:  Increase Victoza to 3 mg gradually as directed.  Stressed diet and exercise.      Orders:  -     liraglutide 0.6 mg/0.1 mL, 18 mg/3 mL, subq PNIJ (VICTOZA 3-YAMILE) 0.6 mg/0.1 mL (18 mg/3 mL) PnIj pen; Inject 3 mg into the skin once daily.  Dispense: 45 mL; Refill: 3    2. Moderate episode of recurrent major depressive disorder  Overview:  Has been a problem intermittently for a couple years. 1/2/24- PHQ9: 18, GAD7:18   Denies ever being treated for this.  Has a history of ADHD and off stimulants for many years.  Requesting to restart.     Assessment & Plan:  Continue fluoxetine 20 mg.  RTC 6 weeks.     Orders:  -     FLUoxetine 20 MG capsule; Take 1 capsule (20 mg total) by mouth once daily.  " Dispense: 90 capsule; Refill: 1    3. Anxiety  -     FLUoxetine 20 MG capsule; Take 1 capsule (20 mg total) by mouth once daily.  Dispense: 90 capsule; Refill: 1    4. Primary hypertension  Overview:  On hctz 12.5 mg daily, valsartan 160 mg daily, labetalol 100 mg bid    Assessment & Plan:  Continue medication without change.  Keep follow up with cardiology.  Encouraged low sodium diet and restart exercise.     Orders:  -     Basic Metabolic Panel; Future; Expected date: 01/23/2024    5. Hypokalemia  Overview:  On K-Dur 10 meq daily.    Assessment & Plan:  Ok to stay off since also off demodex.  Repeat BMP in 6 weeks prior to follow up.       Follow up for 6 wk f/u, non-fasting labs prior. In addition to their next scheduled appointment, the patient has also been instructed to follow up on as needed basis.     Future Appointments   Date Time Provider Department Center   2/29/2024  9:20 AM LAB, [facility] LABORATORY DRAW STATION [facility] RINA [facility]   3/5/2024  7:30 AM Candie Knight FNP-KIT [facility] ALEJANDRA [facility]

## 2024-02-08 ENCOUNTER — HOSPITAL ENCOUNTER (EMERGENCY)
Facility: HOSPITAL | Age: 29
Discharge: HOME | End: 2024-02-08
Attending: FAMILY MEDICINE
Payer: COMMERCIAL

## 2024-02-08 VITALS
TEMPERATURE: 98 F | WEIGHT: 315 LBS | HEIGHT: 66 IN | SYSTOLIC BLOOD PRESSURE: 129 MMHG | HEART RATE: 57 BPM | BODY MASS INDEX: 50.62 KG/M2 | OXYGEN SATURATION: 96 % | RESPIRATION RATE: 17 BRPM | DIASTOLIC BLOOD PRESSURE: 62 MMHG

## 2024-02-08 DIAGNOSIS — R07.9 CHEST PAIN: ICD-10-CM

## 2024-02-08 DIAGNOSIS — R07.9 CHEST PAIN, UNSPECIFIED TYPE: Primary | ICD-10-CM

## 2024-02-08 LAB
ALBUMIN SERPL-MCNC: 4.2 G/DL (ref 3.4–5)
ALBUMIN/GLOB SERPL: 1.3 RATIO
ALP SERPL-CCNC: 63 UNIT/L (ref 50–144)
ALT SERPL-CCNC: 24 UNIT/L (ref 1–45)
ANION GAP SERPL CALC-SCNC: 9 MEQ/L (ref 2–13)
AST SERPL-CCNC: 27 UNIT/L (ref 17–59)
BASOPHILS # BLD AUTO: 0.04 X10(3)/MCL (ref 0.01–0.08)
BASOPHILS NFR BLD AUTO: 0.5 % (ref 0.1–1.2)
BILIRUB SERPL-MCNC: 0.4 MG/DL (ref 0–1)
BUN SERPL-MCNC: 15 MG/DL (ref 7–20)
CALCIUM SERPL-MCNC: 9.3 MG/DL (ref 8.4–10.2)
CHLORIDE SERPL-SCNC: 102 MMOL/L (ref 98–110)
CO2 SERPL-SCNC: 29 MMOL/L (ref 21–32)
CREAT SERPL-MCNC: 0.78 MG/DL (ref 0.66–1.25)
CREAT/UREA NIT SERPL: 19 (ref 12–20)
D DIMER PPP IA.FEU-MCNC: 0.3 MG/L (ref 0.19–0.5)
EOSINOPHIL # BLD AUTO: 0.19 X10(3)/MCL (ref 0.04–0.54)
EOSINOPHIL NFR BLD AUTO: 2.4 % (ref 0.7–7)
ERYTHROCYTE [DISTWIDTH] IN BLOOD BY AUTOMATED COUNT: 13.5 %
GFR SERPLBLD CREATININE-BSD FMLA CKD-EPI: >90 MLS/MIN/1.73/M2
GLOBULIN SER-MCNC: 3.2 GM/DL (ref 2–3.9)
GLUCOSE SERPL-MCNC: 109 MG/DL (ref 70–115)
HCT VFR BLD AUTO: 45.6 % (ref 36–52)
HGB BLD-MCNC: 15.4 G/DL (ref 13–18)
IMM GRANULOCYTES # BLD AUTO: 0.02 X10(3)/MCL (ref 0–0.03)
IMM GRANULOCYTES NFR BLD AUTO: 0.3 % (ref 0–0.5)
LYMPHOCYTES # BLD AUTO: 1.08 X10(3)/MCL (ref 1.32–3.57)
LYMPHOCYTES NFR BLD AUTO: 13.8 % (ref 20–55)
MAGNESIUM SERPL-MCNC: 2.1 MG/DL (ref 1.8–2.4)
MCH RBC QN AUTO: 28.8 PG (ref 27–34)
MCHC RBC AUTO-ENTMCNC: 33.8 G/DL (ref 31–37)
MCV RBC AUTO: 85.4 FL (ref 79–99)
MONOCYTES # BLD AUTO: 0.61 X10(3)/MCL (ref 0.3–0.82)
MONOCYTES NFR BLD AUTO: 7.8 % (ref 4.7–12.5)
NEUTROPHILS # BLD AUTO: 5.91 X10(3)/MCL (ref 1.78–5.38)
NEUTROPHILS NFR BLD AUTO: 75.2 % (ref 37–73)
NRBC BLD AUTO-RTO: 0 %
OHS QRS DURATION: 84 MS
OHS QTC CALCULATION: 420 MS
PLATELET # BLD AUTO: 269 X10(3)/MCL (ref 140–371)
PMV BLD AUTO: 9.5 FL (ref 9.4–12.4)
POTASSIUM SERPL-SCNC: 4 MMOL/L (ref 3.5–5.1)
PROT SERPL-MCNC: 7.4 GM/DL (ref 6.3–8.2)
RBC # BLD AUTO: 5.34 X10(6)/MCL (ref 4–6)
SODIUM SERPL-SCNC: 140 MMOL/L (ref 135–145)
TROPONIN I SERPL-MCNC: <0.012 NG/ML (ref 0–0.03)
WBC # SPEC AUTO: 7.85 X10(3)/MCL (ref 4–11.5)

## 2024-02-08 PROCEDURE — 93010 ELECTROCARDIOGRAM REPORT: CPT | Mod: ,,, | Performed by: INTERNAL MEDICINE

## 2024-02-08 PROCEDURE — 85025 COMPLETE CBC W/AUTO DIFF WBC: CPT | Performed by: FAMILY MEDICINE

## 2024-02-08 PROCEDURE — 99285 EMERGENCY DEPT VISIT HI MDM: CPT | Mod: 25

## 2024-02-08 PROCEDURE — 80053 COMPREHEN METABOLIC PANEL: CPT | Performed by: FAMILY MEDICINE

## 2024-02-08 PROCEDURE — 93005 ELECTROCARDIOGRAM TRACING: CPT

## 2024-02-08 PROCEDURE — 83735 ASSAY OF MAGNESIUM: CPT | Performed by: FAMILY MEDICINE

## 2024-02-08 PROCEDURE — 85379 FIBRIN DEGRADATION QUANT: CPT | Performed by: FAMILY MEDICINE

## 2024-02-08 PROCEDURE — 84484 ASSAY OF TROPONIN QUANT: CPT | Performed by: FAMILY MEDICINE

## 2024-02-08 RX ORDER — PREDNISONE 20 MG/1
20 TABLET ORAL DAILY
Qty: 4 TABLET | Refills: 0 | Status: SHIPPED | OUTPATIENT
Start: 2024-02-08 | End: 2024-03-05

## 2024-02-08 RX ORDER — IBUPROFEN 800 MG/1
800 TABLET ORAL EVERY 8 HOURS PRN
Qty: 30 TABLET | Refills: 0 | Status: SHIPPED | OUTPATIENT
Start: 2024-02-08 | End: 2024-04-04

## 2024-02-08 NOTE — ED PROVIDER NOTES
Encounter Date: 2/8/2024       History     Chief Complaint   Patient presents with    Chest Pain     X 18 hours, sharp stabbing left anterior chest pain - pain inc with deep resp and trunk movement     Patient presents with a chief complaint of chest pain.  Onset yesterday.  The pains are multiple and recurrent.  They have clear precipitating events has represented by deep breathing or certain positions.  It it is always in the same location.  It is described as sharp.  There is no nausea or vomiting.  There is no fevers chills sweats.  There is no shortness a breath or sputum.  There is no hemoptysis.  He has medical history significant for obesity hypertension and a anxiety        Review of patient's allergies indicates:   Allergen Reactions    Lisinopril      Past Medical History:   Diagnosis Date    Broken ankle     CHF (congestive heart failure)     Hypertension      Past Surgical History:   Procedure Laterality Date    ANKLE SURGERY      HTN      hypertension       Family History   Problem Relation Age of Onset    Diabetes Mother     Breast cancer Mother      Social History     Tobacco Use    Smoking status: Never     Passive exposure: Never    Smokeless tobacco: Never   Substance Use Topics    Alcohol use: Yes     Alcohol/week: 24.0 standard drinks of alcohol     Types: 24 Cans of beer per week     Comment: ON WEEKENDS    Drug use: Never     Review of Systems   Constitutional: Negative.    Respiratory: Negative.     Cardiovascular:  Positive for chest pain.   Gastrointestinal: Negative.        Physical Exam     Initial Vitals [02/08/24 0700]   BP Pulse Resp Temp SpO2   (!) 162/82 70 20 98 °F (36.7 °C) (!) 94 %      MAP       --         Physical Exam    Constitutional: He appears well-nourished.   Eyes: EOM are normal. Pupils are equal, round, and reactive to light.   Neck:   Normal range of motion.  Cardiovascular:  Normal rate, regular rhythm and normal heart sounds.           Pulmonary/Chest: Breath sounds  normal.   The patient has reproducible chest pain with palpation and inspiration   Abdominal: Abdomen is soft. Bowel sounds are normal.   Musculoskeletal:      Cervical back: Normal range of motion.     Skin: Skin is warm and dry.         ED Course   Procedures  Labs Reviewed   CBC WITH DIFFERENTIAL - Abnormal; Notable for the following components:       Result Value    Neut % 75.2 (*)     Lymph % 13.8 (*)     Lymph # 1.08 (*)     Neut # 5.91 (*)     All other components within normal limits   MAGNESIUM - Normal   D DIMER, QUANTITATIVE - Normal   TROPONIN I - Normal   CBC W/ AUTO DIFFERENTIAL    Narrative:     The following orders were created for panel order CBC auto differential.  Procedure                               Abnormality         Status                     ---------                               -----------         ------                     CBC with Differential[[id number]]       Abnormal            Final result                 Please view results for these tests on the individual orders.   COMPREHENSIVE METABOLIC PANEL     EKG Readings: (Independently Interpreted)   Initial Reading: No STEMI. Rhythm: Normal Sinus Rhythm. Heart Rate: 72. Ectopy: No Ectopy. ST Segments: Normal ST Segments. T Waves: Normal.       Imaging Results              X-Ray Chest AP Portable (Final result)  Result time 02/08/24 08:01:45      Final result by Paulette Mcguire III, MD (02/08/24 08:01:45)                   Impression:      1. I see no lobar or segmental infiltrates or other significant abnormalities.The heart is moderately enlarged with vascular congestion but no evidence of litzy decompensation.      Electronically signed by: Paulette Mcguire  Date:    02/08/2024  Time:    08:01               Narrative:    EXAMINATION:  STUDY: XR CHEST AP PORTABLE    CLINICAL HISTORY AND TECHNIQUE:  Chest pain    COMPARISON:  08/31/2023    FINDINGS:  The heart is moderately enlarged with mild vascular congestion but no evidence of litzy  decompensation.I see no lobar or segmental infiltrates.No significant pleural effusions are noted.No significant musculoskeletal or vascular abnormalities are appreciated.                        Wet Read by Jonas Billingsley MD (02/08/24 07:57:43, [facility], Emergency Medicine)    Cardiomegaly, no infiltrate /consolidation                                     Medications - No data to display  Medical Decision Making  Amount and/or Complexity of Data Reviewed  Labs: ordered.  Radiology: ordered and independent interpretation performed.      Additional MDM:   Differential Diagnosis:   Differential diagnosis:  Pericarditis myocarditis, pneumonia, chest wall pain, pleurisy, pulmonary embolism                                    Clinical Impression:  Final diagnoses:  [R07.9] Chest pain  [R07.9] Chest pain, unspecified type (Primary)          ED Disposition Condition    Discharge Stable          ED Prescriptions       Medication Sig Dispense Start Date End Date Auth. Provider    predniSONE (DELTASONE) 20 MG tablet Take 1 tablet (20 mg total) by mouth once daily. 4 tablet 2/8/2024 -- Jonas Billingsley MD    ibuprofen (ADVIL,MOTRIN) 800 MG tablet Take 1 tablet (800 mg total) by mouth every 8 (eight) hours as needed. 30 tablet 2/8/2024 -- Jonas Billingsley MD          Follow-up Information       Follow up With Specialties Details Why Contact Info    Candie Knight, LIANEP-C Family Medicine In 2 days  [address]  [address]  Family Medicine Clinic  [facility] [address]  [phone]               Jonas Billingsley MD  02/08/24 6348

## 2024-02-08 NOTE — Clinical Note
"Ramos Asencioradha Knight was seen and treated in our emergency department on 2/8/2024.  He may return to work on 02/09/2024.       If you have any questions or concerns, please don't hesitate to call.      Jonas Billingsley MD"

## 2024-02-23 ENCOUNTER — TELEPHONE (OUTPATIENT)
Dept: FAMILY MEDICINE | Facility: CLINIC | Age: 29
End: 2024-02-23
Payer: COMMERCIAL

## 2024-02-23 DIAGNOSIS — I10 HYPERTENSION, UNSPECIFIED TYPE: ICD-10-CM

## 2024-02-23 DIAGNOSIS — R60.9 EDEMA, UNSPECIFIED TYPE: ICD-10-CM

## 2024-02-23 RX ORDER — HYDROCHLOROTHIAZIDE 12.5 MG/1
12.5 TABLET ORAL DAILY
Qty: 30 TABLET | Refills: 3 | Status: SHIPPED | OUTPATIENT
Start: 2024-02-23 | End: 2024-03-22 | Stop reason: SDUPTHER

## 2024-02-23 NOTE — TELEPHONE ENCOUNTER
Labetalol prescribed by Dr. Love advised patient to call his office for this refill the other one refilled.

## 2024-03-05 ENCOUNTER — PATIENT MESSAGE (OUTPATIENT)
Dept: FAMILY MEDICINE | Facility: CLINIC | Age: 29
End: 2024-03-05

## 2024-03-05 ENCOUNTER — OFFICE VISIT (OUTPATIENT)
Dept: FAMILY MEDICINE | Facility: CLINIC | Age: 29
End: 2024-03-05
Payer: COMMERCIAL

## 2024-03-05 VITALS
OXYGEN SATURATION: 97 % | SYSTOLIC BLOOD PRESSURE: 150 MMHG | BODY MASS INDEX: 50.62 KG/M2 | HEART RATE: 75 BPM | DIASTOLIC BLOOD PRESSURE: 88 MMHG | WEIGHT: 315 LBS | HEIGHT: 66 IN | TEMPERATURE: 97 F

## 2024-03-05 DIAGNOSIS — E66.01 CLASS 3 SEVERE OBESITY DUE TO EXCESS CALORIES WITHOUT SERIOUS COMORBIDITY WITH BODY MASS INDEX (BMI) OF 60.0 TO 69.9 IN ADULT: ICD-10-CM

## 2024-03-05 DIAGNOSIS — I10 PRIMARY HYPERTENSION: Primary | ICD-10-CM

## 2024-03-05 DIAGNOSIS — G47.33 SEVERE OBSTRUCTIVE SLEEP APNEA: ICD-10-CM

## 2024-03-05 PROCEDURE — 3008F BODY MASS INDEX DOCD: CPT | Mod: CPTII,,, | Performed by: NURSE PRACTITIONER

## 2024-03-05 PROCEDURE — 3079F DIAST BP 80-89 MM HG: CPT | Mod: CPTII,,, | Performed by: NURSE PRACTITIONER

## 2024-03-05 PROCEDURE — 4010F ACE/ARB THERAPY RXD/TAKEN: CPT | Mod: CPTII,,, | Performed by: NURSE PRACTITIONER

## 2024-03-05 PROCEDURE — 99214 OFFICE O/P EST MOD 30 MIN: CPT | Mod: ,,, | Performed by: NURSE PRACTITIONER

## 2024-03-05 PROCEDURE — 3077F SYST BP >= 140 MM HG: CPT | Mod: CPTII,,, | Performed by: NURSE PRACTITIONER

## 2024-03-05 RX ORDER — VALSARTAN 320 MG/1
320 TABLET ORAL DAILY
Qty: 90 TABLET | Refills: 1 | Status: SHIPPED | OUTPATIENT
Start: 2024-03-05 | End: 2024-03-20 | Stop reason: SDUPTHER

## 2024-03-05 NOTE — PROGRESS NOTES
"Patient ID: [id number]     Chief Complaint: Labs Only (Hypertension)      HPI:     Ramos Knight is a 28 y.o. male in the office for Labs Only (Hypertension)    He's lost 14 lbs in the last month. He has been compliant with medications.  He's not limiting sodium or exercising regularly yet. He is seeing cardiology. Home sleep study does show severe GOLDEN and waiting on his machine to be delivered.  He's changing jobs and will not have insurance for 3 months.     Past Medical History:  has a past medical history of Broken ankle, CHF (congestive heart failure), and Hypertension.    Social History:  reports that he has never smoked. He has never been exposed to tobacco smoke. He has never used smokeless tobacco. He reports current alcohol use of about 24.0 standard drinks of alcohol per week. He reports that he does not use drugs.    Current Outpatient Medications   Medication Instructions    FLUoxetine 20 mg, Oral, Daily    hydroCHLOROthiazide (HYDRODIURIL) 12.5 mg, Oral, Daily    labetaloL (NORMODYNE) 100 mg, Oral, 2 times daily    SURE COMFORT PEN NEEDLE 32 gauge x 5/32" Ndle 1 each, Subcutaneous, Daily    valsartan (DIOVAN) 320 mg, Oral, Daily    VICTOZA 3-YAMILE 3 mg, Subcutaneous, Daily       Patient is allergic to lisinopril.     Patient Care Team:  Candie Knight FNP-C as PCP - General (Family Medicine)  Rober Love MD as Consulting Physician (Cardiology)     Subjective     Review of Systems    See HPI     Objective     Visit Vitals  BP (!) 150/88 (BP Location: Left arm, Patient Position: Sitting, BP Method: Large (Manual))   Pulse 75   Temp 96.6 °F (35.9 °C) (Temporal)   Ht 5' 5.98" (1.676 m)   Wt (!) 180.8 kg (398 lb 8 oz)   SpO2 97%   BMI 64.35 kg/m²       Physical Exam  Vitals reviewed.   Constitutional:       Appearance: Normal appearance. He is morbidly obese.   Cardiovascular:      Rate and Rhythm: Normal rate and regular rhythm.      Heart sounds: Normal heart sounds.   Pulmonary:      Effort: " Pulmonary effort is normal.      Breath sounds: Normal breath sounds.   Skin:     General: Skin is warm and dry.   Neurological:      Mental Status: He is alert and oriented to person, place, and time.   Psychiatric:         Attention and Perception: Attention normal.         Mood and Affect: Mood and affect normal.         Speech: Speech normal.         Assessment & Plan:     1. Primary hypertension  Overview:  On hctz 12.5 mg daily, valsartan 160 mg daily, labetalol 100 mg bid    Assessment & Plan:  Increase valsartan to 360 mg, continue all other medications  Low Sodium Diet (DASH Diet - Less than 2 grams of sodium per day).  Monitor blood pressure daily and log. Report consistent numbers greater than 140/90.  Maintain healthy weight with goal BMI <30. Exercise 30 minutes per day, 5 days per week.  Smoking cessation encouraged to aid in BP reduction.    Orders:  valsartan (DIOVAN) 320 MG tablet; Take 1 tablet (320 mg total) by mouth once daily.  Dispense: 90 tablet; Refill: 1    2. Class 3 severe obesity due to excess calories without serious comorbidity with body mass index (BMI) of 60.0 to 69.9 in adult  Overview:  On Victoza    Assessment & Plan:  Continue use daily.  Encouraged exercise.     3. Severe obstructive sleep apnea  Assessment & Plan:  Stressed importance of follow up with sleep medicine as directed and compliance with machine once he receives it.  Suspect that his blood pressure will decrease once in use.  RTC 1 month for f/u.     Follow up in about 1 month (around 4/5/2024) for Obesity, HTN. In addition to their next scheduled appointment, the patient has also been instructed to follow up on as needed basis.     Future Appointments   Date Time Provider Department Center   7/2/2024  9:30 AM Candie Knight [facility]        Lab Frequency Next Occurrence   Ambulatory referral/consult to Sleep Disorders Once 01/09/2024   Ambulatory referral/consult to Cardiology Once 01/09/2024

## 2024-03-20 ENCOUNTER — TELEPHONE (OUTPATIENT)
Dept: FAMILY MEDICINE | Facility: CLINIC | Age: 29
End: 2024-03-20
Payer: COMMERCIAL

## 2024-03-20 DIAGNOSIS — I10 PRIMARY HYPERTENSION: ICD-10-CM

## 2024-03-20 RX ORDER — VALSARTAN 320 MG/1
320 TABLET ORAL DAILY
Qty: 90 TABLET | Refills: 0 | Status: SHIPPED | OUTPATIENT
Start: 2024-03-20 | End: 2024-03-22 | Stop reason: SDUPTHER

## 2024-03-22 DIAGNOSIS — I10 PRIMARY HYPERTENSION: ICD-10-CM

## 2024-03-22 DIAGNOSIS — E66.01 CLASS 3 SEVERE OBESITY DUE TO EXCESS CALORIES WITHOUT SERIOUS COMORBIDITY WITH BODY MASS INDEX (BMI) OF 60.0 TO 69.9 IN ADULT: ICD-10-CM

## 2024-03-22 DIAGNOSIS — I10 HYPERTENSION, UNSPECIFIED TYPE: ICD-10-CM

## 2024-03-22 DIAGNOSIS — R60.9 EDEMA, UNSPECIFIED TYPE: ICD-10-CM

## 2024-03-22 DIAGNOSIS — F41.9 ANXIETY: ICD-10-CM

## 2024-03-22 DIAGNOSIS — F33.1 MODERATE EPISODE OF RECURRENT MAJOR DEPRESSIVE DISORDER: ICD-10-CM

## 2024-03-22 RX ORDER — HYDROCHLOROTHIAZIDE 12.5 MG/1
12.5 TABLET ORAL DAILY
Qty: 90 TABLET | Refills: 3 | Status: SHIPPED | OUTPATIENT
Start: 2024-03-22 | End: 2024-04-04 | Stop reason: SDUPTHER

## 2024-03-22 RX ORDER — FLUOXETINE HYDROCHLORIDE 20 MG/1
20 CAPSULE ORAL DAILY
Qty: 90 CAPSULE | Refills: 3 | Status: SHIPPED | OUTPATIENT
Start: 2024-03-22 | End: 2024-04-04 | Stop reason: SDUPTHER

## 2024-03-22 RX ORDER — LIRAGLUTIDE 6 MG/ML
3 INJECTION SUBCUTANEOUS DAILY
Qty: 45 ML | Refills: 3 | Status: SHIPPED | OUTPATIENT
Start: 2024-03-22

## 2024-03-22 RX ORDER — VALSARTAN 320 MG/1
320 TABLET ORAL DAILY
Qty: 90 TABLET | Refills: 3 | Status: SHIPPED | OUTPATIENT
Start: 2024-03-22 | End: 2024-04-04 | Stop reason: SDUPTHER

## 2024-04-04 ENCOUNTER — OFFICE VISIT (OUTPATIENT)
Dept: FAMILY MEDICINE | Facility: CLINIC | Age: 29
End: 2024-04-04
Payer: COMMERCIAL

## 2024-04-04 VITALS
TEMPERATURE: 98 F | OXYGEN SATURATION: 96 % | HEART RATE: 81 BPM | SYSTOLIC BLOOD PRESSURE: 148 MMHG | WEIGHT: 315 LBS | HEIGHT: 66 IN | BODY MASS INDEX: 50.62 KG/M2 | DIASTOLIC BLOOD PRESSURE: 80 MMHG

## 2024-04-04 DIAGNOSIS — F33.1 MODERATE EPISODE OF RECURRENT MAJOR DEPRESSIVE DISORDER: ICD-10-CM

## 2024-04-04 DIAGNOSIS — I10 HYPERTENSION, UNSPECIFIED TYPE: Primary | ICD-10-CM

## 2024-04-04 DIAGNOSIS — R60.9 EDEMA, UNSPECIFIED TYPE: ICD-10-CM

## 2024-04-04 DIAGNOSIS — F41.9 ANXIETY: ICD-10-CM

## 2024-04-04 DIAGNOSIS — E66.01 CLASS 3 SEVERE OBESITY DUE TO EXCESS CALORIES WITHOUT SERIOUS COMORBIDITY WITH BODY MASS INDEX (BMI) OF 60.0 TO 69.9 IN ADULT: ICD-10-CM

## 2024-04-04 DIAGNOSIS — G47.33 SEVERE OBSTRUCTIVE SLEEP APNEA: ICD-10-CM

## 2024-04-04 PROCEDURE — 99214 OFFICE O/P EST MOD 30 MIN: CPT | Mod: ,,, | Performed by: NURSE PRACTITIONER

## 2024-04-04 PROCEDURE — 4010F ACE/ARB THERAPY RXD/TAKEN: CPT | Mod: CPTII,,, | Performed by: NURSE PRACTITIONER

## 2024-04-04 PROCEDURE — 3077F SYST BP >= 140 MM HG: CPT | Mod: CPTII,,, | Performed by: NURSE PRACTITIONER

## 2024-04-04 PROCEDURE — 1160F RVW MEDS BY RX/DR IN RCRD: CPT | Mod: CPTII,,, | Performed by: NURSE PRACTITIONER

## 2024-04-04 PROCEDURE — 3079F DIAST BP 80-89 MM HG: CPT | Mod: CPTII,,, | Performed by: NURSE PRACTITIONER

## 2024-04-04 PROCEDURE — 1159F MED LIST DOCD IN RCRD: CPT | Mod: CPTII,,, | Performed by: NURSE PRACTITIONER

## 2024-04-04 PROCEDURE — 3008F BODY MASS INDEX DOCD: CPT | Mod: CPTII,,, | Performed by: NURSE PRACTITIONER

## 2024-04-04 RX ORDER — FLUOXETINE HYDROCHLORIDE 20 MG/1
20 CAPSULE ORAL DAILY
Qty: 90 CAPSULE | Refills: 3 | Status: SHIPPED | OUTPATIENT
Start: 2024-04-04

## 2024-04-04 RX ORDER — VALSARTAN 320 MG/1
320 TABLET ORAL DAILY
Qty: 90 TABLET | Refills: 3 | Status: SHIPPED | OUTPATIENT
Start: 2024-04-04

## 2024-04-04 RX ORDER — HYDROCHLOROTHIAZIDE 12.5 MG/1
12.5 TABLET ORAL DAILY
Qty: 90 TABLET | Refills: 3 | Status: SHIPPED | OUTPATIENT
Start: 2024-04-04

## 2024-04-04 RX ORDER — LABETALOL 100 MG/1
100 TABLET, FILM COATED ORAL 2 TIMES DAILY
Qty: 180 TABLET | Refills: 3 | Status: SHIPPED | OUTPATIENT
Start: 2024-04-04

## 2024-04-04 NOTE — PROGRESS NOTES
"Patient ID: [id number]     Chief Complaint: Hypertension and Obesity      HPI:     Ramos Knight is a 28 y.o. male in the office for Hypertension and Obesity    He has lost 7 lb in the last month. Feeling good.  Just finished sleep study.  He's waiting on a call for his CPAP machine.  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  He'll work 28/14.    Past Medical History:  has a past medical history of Broken ankle, CHF (congestive heart failure), and Hypertension.    Social History:  reports that he has never smoked. He has never been exposed to tobacco smoke. He has never used smokeless tobacco. He reports current alcohol use of about 24.0 standard drinks of alcohol per week. He reports that he does not use drugs.    Current Outpatient Medications   Medication Instructions    FLUoxetine 20 mg, Oral, Daily    hydroCHLOROthiazide (HYDRODIURIL) 12.5 mg, Oral, Daily    labetaloL (NORMODYNE) 100 mg, Oral, 2 times daily    SURE COMFORT PEN NEEDLE 32 gauge x 5/32" Ndle 1 each, Subcutaneous, Daily    valsartan (DIOVAN) 320 mg, Oral, Daily    VICTOZA 3-YAMILE 3 mg, Subcutaneous, Daily       Patient is allergic to lisinopril.     Patient Care Team:  Candie Knight FNP-C as PCP - General (Family Medicine)  Rober Love MD as Consulting Physician (Cardiology)     Subjective     Review of Systems    See HPI     Objective     Visit Vitals  BP (!) 148/80 (BP Location: Left arm, Patient Position: Sitting, BP Method: Large (Manual))   Pulse 81   Temp 97.5 °F (36.4 °C) (Temporal)   Ht 5' 5.98" (1.676 m)   Wt (!) 177.4 kg (391 lb)   SpO2 96%   BMI 63.15 kg/m²       Physical Exam  Vitals reviewed.   Constitutional:       Appearance: Normal appearance. He is obese.   Cardiovascular:      Rate and Rhythm: Normal rate and regular rhythm.      Heart sounds: Normal heart sounds.   Pulmonary:      Effort: Pulmonary effort is normal.      Breath sounds: Normal breath sounds.   Skin:     " General: Skin is warm and dry.   Neurological:      Mental Status: He is alert and oriented to person, place, and time.   Psychiatric:         Mood and Affect: Mood normal.         Assessment & Plan:     1. Hypertension, unspecified type  Overview:  On hctz 12.5 mg daily, valsartan 320 mg daily, labetalol 100 mg bid    Assessment & Plan:  Blood pressure not quite to goal, I suspect that it will decrease once he starts using his CPAP machine.  Continue medications above.  Follow up in 3 months.  Encouraged low-sodium diet, exercise.  Continued weight loss.    Orders:  -     valsartan (DIOVAN) 320 MG tablet; Take 1 tablet (320 mg total) by mouth once daily.  Dispense: 90 tablet; Refill: 3  -     hydroCHLOROthiazide (HYDRODIURIL) 12.5 MG Tab; Take 1 tablet (12.5 mg total) by mouth once daily.  Dispense: 90 tablet; Refill: 3  -     CBC Auto Differential; Future; Expected date: 07/04/2024  -     Comprehensive Metabolic Panel; Future; Expected date: 07/04/2024    2. Edema, unspecified type  -     hydroCHLOROthiazide (HYDRODIURIL) 12.5 MG Tab; Take 1 tablet (12.5 mg total) by mouth once daily.  Dispense: 90 tablet; Refill: 3    3. Moderate episode of recurrent major depressive disorder  Overview:  Has been a problem intermittently for a couple years. 1/2/24- PHQ9: 18, GAD7:18   Denies ever being treated for this.  Has a history of ADHD and off stimulants for many years.  Requesting to restart.     Orders:  -     FLUoxetine 20 MG capsule; Take 1 capsule (20 mg total) by mouth once daily.  Dispense: 90 capsule; Refill: 3    4. Anxiety  -     FLUoxetine 20 MG capsule; Take 1 capsule (20 mg total) by mouth once daily.  Dispense: 90 capsule; Refill: 3    5. Class 3 severe obesity due to excess calories without serious comorbidity with body mass index (BMI) of 60.0 to 69.9 in adult  Overview:  On Victoza    Assessment & Plan:  Continue victoza as directed.  Encouraged continued exercise and weight loss.       6. Severe  obstructive sleep apnea  Assessment & Plan:  Stressed importance of cpap use as soon as machine comes in.      Other orders  -     labetaloL (NORMODYNE) 100 MG tablet; Take 1 tablet (100 mg total) by mouth 2 (two) times daily.  Dispense: 180 tablet; Refill: 3         Follow up for 1), 3 mo f/u, non-fasting labs prior. In addition to their next scheduled appointment, the patient has also been instructed to follow up on as needed basis.     Future Appointments   Date Time Provider Department Center   7/2/2024  9:30 AM Candie Knight, [facility]        Lab Frequency Next Occurrence   Ambulatory referral/consult to Sleep Disorders Once 01/09/2024   Ambulatory referral/consult to Cardiology Once 01/09/2024

## 2024-04-04 NOTE — ASSESSMENT & PLAN NOTE
Blood pressure not quite to goal, I suspect that it will decrease once he starts using his CPAP machine.  Continue medications above.  Follow up in 3 months.  Encouraged low-sodium diet, exercise.  Continued weight loss.

## 2024-04-12 NOTE — ASSESSMENT & PLAN NOTE
Increase valsartan to 360 mg, continue all other medications  Low Sodium Diet (DASH Diet - Less than 2 grams of sodium per day).  Monitor blood pressure daily and log. Report consistent numbers greater than 140/90.  Maintain healthy weight with goal BMI <30. Exercise 30 minutes per day, 5 days per week.  Smoking cessation encouraged to aid in BP reduction.

## 2024-04-12 NOTE — ASSESSMENT & PLAN NOTE
Stressed importance of follow up with sleep medicine as directed and compliance with machine once he receives it.  Suspect that his blood pressure will decrease once in use.  RTC 1 month for f/u.

## 2024-07-31 ENCOUNTER — OFFICE VISIT (OUTPATIENT)
Dept: FAMILY MEDICINE | Facility: CLINIC | Age: 29
End: 2024-07-31
Payer: COMMERCIAL

## 2024-07-31 VITALS
SYSTOLIC BLOOD PRESSURE: 140 MMHG | OXYGEN SATURATION: 96 % | BODY MASS INDEX: 50.62 KG/M2 | HEART RATE: 93 BPM | HEIGHT: 66 IN | TEMPERATURE: 97 F | DIASTOLIC BLOOD PRESSURE: 88 MMHG | WEIGHT: 315 LBS

## 2024-07-31 DIAGNOSIS — G47.33 SEVERE OBSTRUCTIVE SLEEP APNEA: ICD-10-CM

## 2024-07-31 DIAGNOSIS — I10 PRIMARY HYPERTENSION: ICD-10-CM

## 2024-07-31 DIAGNOSIS — E66.01 CLASS 3 SEVERE OBESITY DUE TO EXCESS CALORIES WITHOUT SERIOUS COMORBIDITY WITH BODY MASS INDEX (BMI) OF 60.0 TO 69.9 IN ADULT: Primary | ICD-10-CM

## 2024-07-31 DIAGNOSIS — F32.1 CURRENT MODERATE EPISODE OF MAJOR DEPRESSIVE DISORDER WITHOUT PRIOR EPISODE: ICD-10-CM

## 2024-07-31 PROBLEM — E87.6 HYPOKALEMIA: Status: RESOLVED | Noted: 2024-01-02 | Resolved: 2024-07-31

## 2024-07-31 PROCEDURE — 3079F DIAST BP 80-89 MM HG: CPT | Mod: CPTII,,, | Performed by: NURSE PRACTITIONER

## 2024-07-31 PROCEDURE — 1159F MED LIST DOCD IN RCRD: CPT | Mod: CPTII,,, | Performed by: NURSE PRACTITIONER

## 2024-07-31 PROCEDURE — 4010F ACE/ARB THERAPY RXD/TAKEN: CPT | Mod: CPTII,,, | Performed by: NURSE PRACTITIONER

## 2024-07-31 PROCEDURE — 99214 OFFICE O/P EST MOD 30 MIN: CPT | Mod: ,,, | Performed by: NURSE PRACTITIONER

## 2024-07-31 PROCEDURE — 3077F SYST BP >= 140 MM HG: CPT | Mod: CPTII,,, | Performed by: NURSE PRACTITIONER

## 2024-07-31 PROCEDURE — 1160F RVW MEDS BY RX/DR IN RCRD: CPT | Mod: CPTII,,, | Performed by: NURSE PRACTITIONER

## 2024-07-31 PROCEDURE — 3008F BODY MASS INDEX DOCD: CPT | Mod: CPTII,,, | Performed by: NURSE PRACTITIONER

## 2024-07-31 RX ORDER — TIRZEPATIDE 2.5 MG/.5ML
2.5 INJECTION, SOLUTION SUBCUTANEOUS
Qty: 4 PEN | Refills: 0 | Status: SHIPPED | OUTPATIENT
Start: 2024-07-31

## 2024-07-31 RX ORDER — TIRZEPATIDE 5 MG/.5ML
5 INJECTION, SOLUTION SUBCUTANEOUS
Qty: 8 PEN | Refills: 0 | Status: SHIPPED | OUTPATIENT
Start: 2024-07-31

## 2024-07-31 NOTE — PROGRESS NOTES
"Patient ID: [id number]     Chief Complaint: Hypertension and Obesity (3 month follow up)      HPI:     Ramos Knight is a 29 y.o. male in the office for Hypertension and Obesity (3 month follow up)  Not much has changed with BP.  He did not get CPAP yet because [place]  Depression has improved, he work and really likes his new job.  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  He is more physically active at work.  He has gained some weight since being off liraglutide since it is backordered and other options have not been affordable with previous insurance. His next cardiology appointment is at the end of the year. He denies any increase in SOB or swelling.     Past Medical History:  has a past medical history of Broken ankle, CHF (congestive heart failure), and Hypertension.    Social History:  reports that he has never smoked. He has never been exposed to tobacco smoke. He has never used smokeless tobacco. He reports current alcohol use of about 24.0 standard drinks of alcohol per week. He reports that he does not use drugs.    Current Outpatient Medications   Medication Instructions    FLUoxetine 20 mg, Oral, Daily    hydroCHLOROthiazide (HYDRODIURIL) 12.5 mg, Oral, Daily    labetaloL (NORMODYNE) 100 mg, Oral, 2 times daily    SURE COMFORT PEN NEEDLE 32 gauge x 5/32" Ndle 1 each, Subcutaneous, Daily    valsartan (DIOVAN) 320 mg, Oral, Daily    ZEPBOUND 2.5 mg, Subcutaneous, Every 7 days    ZEPBOUND 5 mg, Subcutaneous, Every 7 days       Patient is allergic to lisinopril.     Patient Care Team:  Candie Knight FNP-C as PCP - General (Family Medicine)  Rober Love MD as Consulting Physician (Cardiology)     Subjective     Review of Systems    See HPI     Objective     Visit Vitals  BP (!) 140/88 (BP Location: Left arm, Patient Position: Sitting, BP Method: Medium (Manual))   Pulse 93   Temp 97.2 °F (36.2 °C) (Temporal)   Ht 5' 5.98" (1.676 m)   Wt (!) 182.3 kg (402 lb)   SpO2 " 96%   BMI 64.92 kg/m²       Physical Exam  Vitals reviewed.   Constitutional:       Appearance: Normal appearance.   Cardiovascular:      Rate and Rhythm: Normal rate and regular rhythm.      Heart sounds: Normal heart sounds.   Pulmonary:      Effort: Pulmonary effort is normal.      Breath sounds: Normal breath sounds.   Skin:     General: Skin is warm and dry.   Neurological:      Mental Status: He is alert and oriented to person, place, and time.   Psychiatric:         Mood and Affect: Mood normal.         Assessment & Plan:     1. Class 3 severe obesity due to excess calories without serious comorbidity with body mass index (BMI) of 60.0 to 69.9 in adult  Assessment & Plan:  Stop liraglutide.   Start Zepbound 2.5 mg and increase to 5 mg after 4 weeks.  RTC when he returns from work in about 3 months.     Orders:  -     tirzepatide, weight loss, (ZEPBOUND) 2.5 mg/0.5 mL PnIj; Inject 2.5 mg into the skin every 7 days.  Dispense: 4 Pen; Refill: 0  -     tirzepatide, weight loss, (ZEPBOUND) 5 mg/0.5 mL PnIj; Inject 5 mg into the skin every 7 days.  Dispense: 8 Pen; Refill: 0    2. Severe obstructive sleep apnea  Assessment & Plan:  Encouraged to return call for the company prescribing his CPAP.  He has not been able to get it because he has been at [place] of [place].      3. Primary hypertension  Overview:  On hctz 12.5 mg daily, valsartan 320 mg daily, labetalol 100 mg bid    Assessment & Plan:  Closer to goal.  Expect it to decrease as he loses weight and starts using CPAP.  If not at goal when he returns in 3 months, will adjust medication.       4. Current moderate episode of major depressive disorder without prior episode  Overview:  Has been a problem intermittently for a years but never discussed.    1/2/24- PHQ9: 18, GAD7:18 was treatment naive until the start of fluoxetine 1/23/24.      Assessment & Plan:  Symptoms controlled at this time, he feels happier when he's at work.  Continue fluoxetine.         Follow up in about 3 months (around 10/31/2024) for Anxiety/Depression, Blood Pressure, Obesity. In addition to their next scheduled appointment, the patient has also been instructed to follow up on as needed basis.     Future Appointments   Date Time Provider Department Center   11/26/2024  8:30 AM Candie Knight FNP-[facility]

## 2024-07-31 NOTE — ASSESSMENT & PLAN NOTE
Stop liraglutide.   Start Zepbound 2.5 mg and increase to 5 mg after 4 weeks.  RTC when he returns from work in about 3 months.

## 2024-07-31 NOTE — ASSESSMENT & PLAN NOTE
Encouraged to return call for the company prescribing his CPAP.  He has not been able to get it because he has been at [place].

## 2024-07-31 NOTE — ASSESSMENT & PLAN NOTE
Closer to goal.  Expect it to decrease as he loses weight and starts using CPAP.  If not at goal when he returns in 3 months, will adjust medication.

## 2024-10-28 DIAGNOSIS — E66.01 CLASS 3 SEVERE OBESITY DUE TO EXCESS CALORIES WITHOUT SERIOUS COMORBIDITY WITH BODY MASS INDEX (BMI) OF 60.0 TO 69.9 IN ADULT: ICD-10-CM

## 2024-10-28 DIAGNOSIS — E66.813 CLASS 3 SEVERE OBESITY DUE TO EXCESS CALORIES WITHOUT SERIOUS COMORBIDITY WITH BODY MASS INDEX (BMI) OF 60.0 TO 69.9 IN ADULT: ICD-10-CM

## 2024-10-28 RX ORDER — TIRZEPATIDE 5 MG/.5ML
INJECTION, SOLUTION SUBCUTANEOUS
Qty: 6 ML | Refills: 0 | Status: SHIPPED | OUTPATIENT
Start: 2024-10-28

## 2024-11-19 ENCOUNTER — TELEPHONE (OUTPATIENT)
Dept: FAMILY MEDICINE | Facility: CLINIC | Age: 29
End: 2024-11-19
Payer: COMMERCIAL

## 2024-11-19 DIAGNOSIS — E66.813 CLASS 3 SEVERE OBESITY DUE TO EXCESS CALORIES WITHOUT SERIOUS COMORBIDITY WITH BODY MASS INDEX (BMI) OF 60.0 TO 69.9 IN ADULT: Primary | ICD-10-CM

## 2024-11-19 DIAGNOSIS — E66.01 CLASS 3 SEVERE OBESITY DUE TO EXCESS CALORIES WITHOUT SERIOUS COMORBIDITY WITH BODY MASS INDEX (BMI) OF 60.0 TO 69.9 IN ADULT: Primary | ICD-10-CM

## 2024-11-19 RX ORDER — TIRZEPATIDE 7.5 MG/.5ML
7.5 INJECTION, SOLUTION SUBCUTANEOUS
Qty: 4 PEN | Refills: 3 | Status: SHIPPED | OUTPATIENT
Start: 2024-11-19

## 2024-11-19 NOTE — TELEPHONE ENCOUNTER
He wants to increase the zepbound, says he has been on the 5 for awhile.  Can we increase it and push his appointment back to after you return?

## 2025-02-20 ENCOUNTER — OFFICE VISIT (OUTPATIENT)
Dept: FAMILY MEDICINE | Facility: CLINIC | Age: 30
End: 2025-02-20
Payer: COMMERCIAL

## 2025-02-20 ENCOUNTER — HOSPITAL ENCOUNTER (OUTPATIENT)
Dept: RADIOLOGY | Facility: HOSPITAL | Age: 30
Discharge: HOME | End: 2025-02-20
Attending: NURSE PRACTITIONER
Payer: COMMERCIAL

## 2025-02-20 VITALS
HEIGHT: 66 IN | DIASTOLIC BLOOD PRESSURE: 82 MMHG | HEART RATE: 77 BPM | TEMPERATURE: 99 F | BODY MASS INDEX: 50.62 KG/M2 | WEIGHT: 315 LBS | OXYGEN SATURATION: 97 % | SYSTOLIC BLOOD PRESSURE: 134 MMHG

## 2025-02-20 DIAGNOSIS — E66.01 CLASS 3 SEVERE OBESITY DUE TO EXCESS CALORIES WITHOUT SERIOUS COMORBIDITY WITH BODY MASS INDEX (BMI) OF 60.0 TO 69.9 IN ADULT: ICD-10-CM

## 2025-02-20 DIAGNOSIS — J40 BRONCHITIS: ICD-10-CM

## 2025-02-20 DIAGNOSIS — E66.813 CLASS 3 SEVERE OBESITY DUE TO EXCESS CALORIES WITHOUT SERIOUS COMORBIDITY WITH BODY MASS INDEX (BMI) OF 60.0 TO 69.9 IN ADULT: ICD-10-CM

## 2025-02-20 DIAGNOSIS — J40 BRONCHITIS: Primary | ICD-10-CM

## 2025-02-20 DIAGNOSIS — G47.33 SEVERE OBSTRUCTIVE SLEEP APNEA: ICD-10-CM

## 2025-02-20 PROCEDURE — 71046 X-RAY EXAM CHEST 2 VIEWS: CPT | Mod: TC

## 2025-02-20 RX ORDER — ALBUTEROL SULFATE 90 UG/1
2 INHALANT RESPIRATORY (INHALATION) EVERY 6 HOURS PRN
Qty: 18 G | Refills: 0 | Status: SHIPPED | OUTPATIENT
Start: 2025-02-20 | End: 2026-02-20

## 2025-02-20 RX ORDER — BUDESONIDE AND FORMOTEROL FUMARATE DIHYDRATE 80; 4.5 UG/1; UG/1
2 AEROSOL RESPIRATORY (INHALATION) 2 TIMES DAILY
Qty: 10.2 G | Refills: 0 | Status: SHIPPED | OUTPATIENT
Start: 2025-02-20 | End: 2026-02-20

## 2025-02-20 RX ORDER — DOXYCYCLINE 100 MG/1
100 CAPSULE ORAL 2 TIMES DAILY
Qty: 20 CAPSULE | Refills: 0 | Status: SHIPPED | OUTPATIENT
Start: 2025-02-20 | End: 2025-03-02

## 2025-02-20 NOTE — PROGRESS NOTES
Patient ID: [id number]     Chief Complaint: Anxiety and Depression      HPI:     Ramos Knight is a 29 y.o. male here today for Anxiety and Depression    He has been coughing up blood for about 3 weeks. This has happened several times in the past 3 years.  He was treated at urgent care last week with zpak and prednisone, no resolution.  He feels bad, SOB, more swelling in legs than usual over the last couple of days.  Ate crawfish over the weekend.  EVANS. Albuterol inhaler not helping.  Next visit with cardiology in about 1 month.     Anxiety and depression controlled.      Labs reviewed and discussed.     Out of Zepbound for the last month, unable to get from pharmacy.  No side effects with the 5 mg dose.  Would like to increase to 7.5 mg weekly.         2/20/2025    11:13 AM   MICHAELLE-7   Was test performed? Yes   1. Feeling nervous, anxious, or on edge? Not at all   2. Not being able to stop or control worrying? Not at all   3. Worrying too much about different things? Not at all   4. Trouble relaxing? Several days   5. Being so restless that it is hard to sit still? Several days   6. Becoming easily annoyed or irritable? Several days   7. Feeling afraid as if something awful might happen? Not at all   8. If you checked off any problems, how difficult have these problems made it for you to do your work, take care of things at home, or get along with other people? Not difficult at all   MICHAELLE-7 Score 3   Number answered (out of first 7) 7   Interpretation Normal         2/20/2025    11:12 AM 3/5/2024     7:28 AM 7/26/2023     2:42 PM   Depression Patient Health Questionnaire   Over the last two weeks how often have you been bothered by little interest or pleasure in doing things Not at all Not at all More than half the days   Over the last two weeks how often have you been bothered by feeling down, depressed or hopeless Not at all Not at all Several days   PHQ-2 Total Score 0 0 3   Over the last two weeks how often have  you been bothered by trouble falling or staying asleep, or sleeping too much   Not at all   Over the last two weeks how often have you been bothered by feeling tired or having little energy   Not at all   Over the last two weeks how often have you been bothered by a poor appetite or overeating   Nearly every day   Over the last two weeks how often have you been bothered by feeling bad about yourself - or that you are a failure or have let yourself or your family down   Nearly every day   Over the last two weeks how often have you been bothered by trouble concentrating on things, such as reading the newspaper or watching television   Not at all   Over the last two weeks how often have you been bothered by moving or speaking so slowly that other people could have noticed. Or the opposite - being so fidgety or restless that you have been moving around a lot more than usual.   Not at all   Over the last two weeks how often have you been bothered by thoughts that you would be better off dead, or of hurting yourself   Not at all   If you checked off any problems, how difficult have these problems made it for you to do your work, take care of things at home or get along with other people?   Not difficult at all   PHQ-9 Score   9   PHQ-9 Interpretation   Mild     Past Medical History:  has a past medical history of Broken ankle, CHF (congestive heart failure), and Hypertension.    Surgical History:  has a past surgical history that includes Ankle surgery; HTN; and hypertension.    Family History: family history includes Breast cancer in his mother; Diabetes in his mother.    Social History:  reports that he has never smoked. He has never been exposed to tobacco smoke. He has never used smokeless tobacco. He reports current alcohol use of about 24.0 standard drinks of alcohol per week. He reports that he does not use drugs.    Current Outpatient Medications   Medication Instructions    albuterol (PROVENTIL HFA) 90  "mcg/actuation inhaler 2 puffs, Inhalation, Every 6 hours PRN, Rescue    budesonide-formoterol 80-4.5 mcg (SYMBICORT) 80-4.5 mcg/actuation HFAA 2 puffs, Inhalation, 2 times daily, Controller    doxycycline (VIBRAMYCIN) 100 mg, Oral, 2 times daily    FLUoxetine 20 mg, Oral, Daily    hydroCHLOROthiazide 12.5 mg, Oral, Daily    labetaloL (NORMODYNE) 100 mg, Oral, 2 times daily    SURE COMFORT PEN NEEDLE 32 gauge x 5/32" Ndle 1 each, Daily    valsartan (DIOVAN) 320 mg, Oral, Daily    ZEPBOUND 7.5 mg, Subcutaneous, Every 7 days       Patient is allergic to lisinopril.     Patient Care Team:  Candie Knight FNP-C as PCP - General (Family Medicine)  Rober Love MD as Consulting Physician (Cardiology)       Subjective:     Review of Systems    See HPI     Objective:     Visit Vitals  /82 (Patient Position: Sitting)   Pulse 77   Temp 98.9 °F (37.2 °C)   Ht 5' 5.98" (1.676 m)   Wt (!) 191.9 kg (423 lb)   SpO2 97%   BMI 68.32 kg/m²       Physical Exam  Vitals reviewed.   Constitutional:       General: He is not in acute distress.     Appearance: Normal appearance. He is morbidly obese. He is ill-appearing.   HENT:      Nose: Congestion present. No rhinorrhea.   Cardiovascular:      Rate and Rhythm: Normal rate and regular rhythm.   Pulmonary:      Effort: Pulmonary effort is normal. No respiratory distress.      Breath sounds: Wheezing present.   Musculoskeletal:      Right lower leg: Edema present.      Left lower leg: Edema present.   Skin:     General: Skin is warm and dry.   Neurological:      Mental Status: He is alert and oriented to person, place, and time.   Psychiatric:         Attention and Perception: Attention normal.         Mood and Affect: Mood and affect normal.         Speech: Speech normal.       Labs Reviewed:     Chemistry:  Lab Results   Component Value Date     02/29/2024    K 4.3 02/29/2024    BUN 16 02/29/2024    CREATININE 0.98 02/29/2024    EGFRNORACEVR 108 02/29/2024    GLUCOSE 109 " 02/08/2024    CALCIUM 9.6 02/29/2024    ALKPHOS 63 02/08/2024    LABPROT 7.4 02/08/2024    ALBUMIN 4.2 02/08/2024    AST 27 02/08/2024    ALT 24 02/08/2024    MG 2.10 02/08/2024    TSH 2.800 07/26/2023        Lab Results   Component Value Date    HGBA1C 5.4 07/26/2023        Hematology:  Lab Results   Component Value Date    WBC 7.85 02/08/2024    RBC 5.34 02/08/2024    HGB 15.4 02/08/2024    HCT 45.6 02/08/2024    MCV 85.4 02/08/2024    MCH 28.8 02/08/2024    MCHC 33.8 02/08/2024    RDW 13.5 02/08/2024     02/08/2024    MPV 9.5 02/08/2024       Assessment & Plan:     1. Bronchitis  Comments:  failed zith.  start doxycycline. cxr today.  no more steroids.  start symbicort and cont albuterol. RTC 1 week before he returns to work.  Orders:  -     doxycycline (VIBRAMYCIN) 100 MG Cap; Take 1 capsule (100 mg total) by mouth 2 (two) times daily. for 10 days  Dispense: 20 capsule; Refill: 0  -     albuterol (PROVENTIL HFA) 90 mcg/actuation inhaler; Inhale 2 puffs into the lungs every 6 (six) hours as needed for Wheezing. Rescue  Dispense: 18 g; Refill: 0  -     X-Ray Chest PA And Lateral; Future; Expected date: 02/20/2025  -     budesonide-formoterol 80-4.5 mcg (SYMBICORT) 80-4.5 mcg/actuation HFAA; Inhale 2 puffs into the lungs 2 (two) times a day. Controller  Dispense: 10.2 g; Refill: 0    2. Severe obstructive sleep apnea  Assessment & Plan:  He needs a CPAP, will attempt to facilitate this as he's failed to get in contact with supplier yet.        3. Class 3 severe obesity due to excess calories without serious comorbidity with body mass index (BMI) of 60.0 to 69.9 in adult  Overview:  Cleared up problem at the pharmacy.  They have ordered his Zepbound 7.5 and should be in tomorrow.  He was encouraged to stay on this medication for best results.  I'd like him to follow up every 6-8 weeks to monitor his results.        Follow up in about 1 week (around 2/27/2025). In addition to their scheduled follow up, the  patient has also been instructed to follow up on as needed basis.     Future Appointments   Date Time Provider Department Center   2/27/2025  7:30 AM Candie Knight, [facility]

## 2025-02-21 ENCOUNTER — TELEPHONE (OUTPATIENT)
Dept: FAMILY MEDICINE | Facility: CLINIC | Age: 30
End: 2025-02-21
Payer: COMMERCIAL

## 2025-02-21 NOTE — TELEPHONE ENCOUNTER
Spoke with [facility] practitioners and they said in order for him to get his CPAP he has to actually keep his follow up with them, he has cancelled or no showed several since march of last year.

## 2025-02-21 NOTE — ASSESSMENT & PLAN NOTE
He needs a CPAP, will attempt to facilitate this as he's failed to get in contact with supplier yet.

## 2025-02-27 ENCOUNTER — OFFICE VISIT (OUTPATIENT)
Dept: FAMILY MEDICINE | Facility: CLINIC | Age: 30
End: 2025-02-27
Payer: COMMERCIAL

## 2025-02-27 VITALS
HEIGHT: 66 IN | HEART RATE: 71 BPM | BODY MASS INDEX: 50.62 KG/M2 | WEIGHT: 315 LBS | SYSTOLIC BLOOD PRESSURE: 132 MMHG | DIASTOLIC BLOOD PRESSURE: 80 MMHG | TEMPERATURE: 98 F | OXYGEN SATURATION: 96 %

## 2025-02-27 DIAGNOSIS — F33.1 MODERATE EPISODE OF RECURRENT MAJOR DEPRESSIVE DISORDER: ICD-10-CM

## 2025-02-27 DIAGNOSIS — R60.9 EDEMA, UNSPECIFIED TYPE: ICD-10-CM

## 2025-02-27 DIAGNOSIS — F41.9 ANXIETY: ICD-10-CM

## 2025-02-27 DIAGNOSIS — I10 HYPERTENSION, UNSPECIFIED TYPE: ICD-10-CM

## 2025-02-27 DIAGNOSIS — E66.01 CLASS 3 SEVERE OBESITY DUE TO EXCESS CALORIES WITHOUT SERIOUS COMORBIDITY WITH BODY MASS INDEX (BMI) OF 60.0 TO 69.9 IN ADULT: ICD-10-CM

## 2025-02-27 DIAGNOSIS — E66.813 CLASS 3 SEVERE OBESITY DUE TO EXCESS CALORIES WITHOUT SERIOUS COMORBIDITY WITH BODY MASS INDEX (BMI) OF 60.0 TO 69.9 IN ADULT: ICD-10-CM

## 2025-02-27 DIAGNOSIS — J40 BRONCHITIS: ICD-10-CM

## 2025-02-27 PROCEDURE — 3075F SYST BP GE 130 - 139MM HG: CPT | Mod: CPTII,,, | Performed by: NURSE PRACTITIONER

## 2025-02-27 PROCEDURE — 3044F HG A1C LEVEL LT 7.0%: CPT | Mod: CPTII,,, | Performed by: NURSE PRACTITIONER

## 2025-02-27 PROCEDURE — 4010F ACE/ARB THERAPY RXD/TAKEN: CPT | Mod: CPTII,,, | Performed by: NURSE PRACTITIONER

## 2025-02-27 PROCEDURE — 99213 OFFICE O/P EST LOW 20 MIN: CPT | Mod: ,,, | Performed by: NURSE PRACTITIONER

## 2025-02-27 PROCEDURE — 3079F DIAST BP 80-89 MM HG: CPT | Mod: CPTII,,, | Performed by: NURSE PRACTITIONER

## 2025-02-27 PROCEDURE — G2211 COMPLEX E/M VISIT ADD ON: HCPCS | Mod: ,,, | Performed by: NURSE PRACTITIONER

## 2025-02-27 PROCEDURE — 3008F BODY MASS INDEX DOCD: CPT | Mod: CPTII,,, | Performed by: NURSE PRACTITIONER

## 2025-02-27 RX ORDER — FLUOXETINE 20 MG/1
20 CAPSULE ORAL DAILY
Qty: 90 CAPSULE | Refills: 3 | Status: SHIPPED | OUTPATIENT
Start: 2025-02-27

## 2025-02-27 RX ORDER — BUDESONIDE AND FORMOTEROL FUMARATE DIHYDRATE 80; 4.5 UG/1; UG/1
2 AEROSOL RESPIRATORY (INHALATION) 2 TIMES DAILY
Qty: 30.6 G | Refills: 3 | Status: SHIPPED | OUTPATIENT
Start: 2025-02-27 | End: 2026-02-27

## 2025-02-27 RX ORDER — HYDROCHLOROTHIAZIDE 12.5 MG/1
12.5 TABLET ORAL DAILY
Qty: 90 TABLET | Refills: 3 | Status: SHIPPED | OUTPATIENT
Start: 2025-02-27

## 2025-02-27 RX ORDER — LABETALOL 100 MG/1
100 TABLET, FILM COATED ORAL 2 TIMES DAILY
Qty: 180 TABLET | Refills: 3 | Status: SHIPPED | OUTPATIENT
Start: 2025-02-27

## 2025-02-27 RX ORDER — VALSARTAN 320 MG/1
320 TABLET ORAL DAILY
Qty: 90 TABLET | Refills: 3 | Status: SHIPPED | OUTPATIENT
Start: 2025-02-27

## 2025-02-27 RX ORDER — TIRZEPATIDE 10 MG/.5ML
10 INJECTION, SOLUTION SUBCUTANEOUS
Qty: 12 PEN | Refills: 0 | Status: SHIPPED | OUTPATIENT
Start: 2025-02-27 | End: 2025-04-30

## 2025-02-27 NOTE — PROGRESS NOTES
"Patient ID: [id number]     Chief Complaint: Bronchitis and Follow-up      HPI:     Ramos Knight is a 30 y.o. male in the office for Bronchitis and Follow-up    Symptoms improved after starting doxycycline, symbicort.  He feels well enough to return to work.  He believes his breathing is back at baseline.     Has restarted Zepbound.      Past Medical History:  has a past medical history of Broken ankle, CHF (congestive heart failure), Hypertension, and Sepsis.    Social History:  reports that he has never smoked. He has never been exposed to tobacco smoke. He has never used smokeless tobacco. He reports current alcohol use of about 24.0 standard drinks of alcohol per week. He reports that he does not use drugs.    Current Outpatient Medications   Medication Instructions    albuterol (PROVENTIL HFA) 90 mcg/actuation inhaler 2 puffs, Inhalation, Every 6 hours PRN, Rescue    budesonide-formoterol 80-4.5 mcg (SYMBICORT) 80-4.5 mcg/actuation HFAA 2 puffs, Inhalation, 2 times daily, Controller    FLUoxetine 20 mg, Oral, Daily    hydroCHLOROthiazide 12.5 mg, Oral, Daily    labetaloL (NORMODYNE) 100 mg, Oral, 2 times daily    pantoprazole (PROTONIX) 40 mg, Oral, Daily    SURE COMFORT PEN NEEDLE 32 gauge x 5/32" Ndle 1 each, Daily    valsartan (DIOVAN) 320 mg, Oral, Daily    ZEPBOUND 12.5 mg, Subcutaneous, Every 7 days       Patient is allergic to lisinopril.     Patient Care Team:  Candie Knight FNP-C as PCP - General (Family Medicine)  Rober Love MD as Consulting Physician (Cardiology)     Subjective     Review of Systems    See HPI     Objective     Visit Vitals  /80 (BP Location: Right arm)   Pulse 71   Temp 97.5 °F (36.4 °C) (Temporal)   Ht 5' 5.98" (1.676 m)   Wt (!) 191 kg (421 lb)   SpO2 96%   BMI 67.99 kg/m²       Physical Exam  Vitals reviewed.   Constitutional:       General: He is not in acute distress.     Appearance: He is morbidly obese. He is not ill-appearing.   HENT:      Nose: No congestion " or rhinorrhea.   Cardiovascular:      Rate and Rhythm: Normal rate and regular rhythm.   Pulmonary:      Effort: Pulmonary effort is normal. No respiratory distress.   Musculoskeletal:      Right lower leg: Edema present.      Left lower leg: Edema present.   Skin:     General: Skin is warm and dry.   Neurological:      Mental Status: He is alert and oriented to person, place, and time.   Psychiatric:         Attention and Perception: Attention normal.         Mood and Affect: Mood and affect normal.         Speech: Speech normal.         Assessment & Plan:     1. Bronchitis  -     budesonide-formoterol 80-4.5 mcg (SYMBICORT) 80-4.5 mcg/actuation HFAA; Inhale 2 puffs into the lungs 2 (two) times a day. Controller  Dispense: 30.6 g; Refill: 3    2. Moderate episode of recurrent major depressive disorder  Overview:  Has been a problem intermittently for a years but never discussed.    1/2/24- PHQ9: 18, GAD7:18 was treatment naive until the start of fluoxetine 1/23/24.      Orders:  -     FLUoxetine 20 MG capsule; Take 1 capsule (20 mg total) by mouth once daily.  Dispense: 90 capsule; Refill: 3    3. Anxiety  -     FLUoxetine 20 MG capsule; Take 1 capsule (20 mg total) by mouth once daily.  Dispense: 90 capsule; Refill: 3    4. Hypertension, unspecified type  Overview:  On hctz 12.5 mg daily, valsartan 320 mg daily, labetalol 100 mg bid    Orders:  -     hydroCHLOROthiazide 12.5 MG Tab; Take 1 tablet (12.5 mg total) by mouth once daily.  Dispense: 90 tablet; Refill: 3  -     valsartan (DIOVAN) 320 MG tablet; Take 1 tablet (320 mg total) by mouth once daily.  Dispense: 90 tablet; Refill: 3    5. Edema, unspecified type  -     hydroCHLOROthiazide 12.5 MG Tab; Take 1 tablet (12.5 mg total) by mouth once daily.  Dispense: 90 tablet; Refill: 3    6. Class 3 severe obesity due to excess calories without serious comorbidity with body mass index (BMI) of 60.0 to 69.9 in adult  Overview:  On Mounjaro 12.5 mg weekly      Other  orders  -     labetaloL (NORMODYNE) 100 MG tablet; Take 1 tablet (100 mg total) by mouth 2 (two) times daily.  Dispense: 180 tablet; Refill: 3  -     Discontinue: tirzepatide, weight loss, (ZEPBOUND) 10 mg/0.5 mL PnIj; Inject 10 mg into the skin every 7 days.  Dispense: 12 Pen; Refill: 0         Follow up for 3-4 month obesity, bp. In addition to their next scheduled appointment, the patient has also been instructed to follow up on as needed basis.     Future Appointments   Date Time Provider Department Center   9/25/2025  2:30 PM Candie Knight, [facility]        Lab Frequency Next Occurrence   CBC Auto Differential Once 07/04/2024   Comprehensive Metabolic Panel Once 07/04/2024

## 2025-04-14 ENCOUNTER — HOSPITAL ENCOUNTER (EMERGENCY)
Facility: HOSPITAL | Age: 30
Discharge: HOME | End: 2025-04-14
Payer: COMMERCIAL

## 2025-04-14 VITALS
RESPIRATION RATE: 19 BRPM | OXYGEN SATURATION: 98 % | HEIGHT: 66 IN | TEMPERATURE: 98 F | SYSTOLIC BLOOD PRESSURE: 129 MMHG | HEART RATE: 78 BPM | WEIGHT: 315 LBS | DIASTOLIC BLOOD PRESSURE: 64 MMHG | BODY MASS INDEX: 50.62 KG/M2

## 2025-04-14 DIAGNOSIS — R05.3 CHRONIC COUGH: ICD-10-CM

## 2025-04-14 PROCEDURE — 99283 EMERGENCY DEPT VISIT LOW MDM: CPT | Mod: 25

## 2025-04-14 RX ORDER — GUAIFENESIN AND DEXTROMETHORPHAN HYDROBROMIDE 10; 100 MG/5ML; MG/5ML
5 SYRUP ORAL 4 TIMES DAILY PRN
Qty: 120 ML | Refills: 0 | Status: SHIPPED | OUTPATIENT
Start: 2025-04-14 | End: 2025-04-24

## 2025-04-15 NOTE — ED PROVIDER NOTES
Encounter Date: 4/14/2025       History     Chief Complaint   Patient presents with    Cough     Pt reports cough for months, now has right sided rib pain.      See MDM    The history is provided by the patient and a parent. No  was used.     Review of patient's allergies indicates:   Allergen Reactions    Lisinopril      Past Medical History:   Diagnosis Date    Broken ankle     CHF (congestive heart failure)     Hypertension      Past Surgical History:   Procedure Laterality Date    ANKLE SURGERY      HTN      hypertension       Family History   Problem Relation Name Age of Onset    Diabetes Mother      Breast cancer Mother       Social History[1]  Review of Systems   Constitutional:  Negative for chills and fever.   Respiratory:  Positive for cough.    All other systems reviewed and are negative.      Physical Exam     Initial Vitals [04/14/25 1920]   BP Pulse Resp Temp SpO2   (!) 166/82 85 (!) 22 98.4 °F (36.9 °C) 96 %      MAP       --         Physical Exam    Nursing note and vitals reviewed.  Constitutional: He appears well-developed and well-nourished. He is not diaphoretic. No distress.   Morbidly obese   HENT:   Head: Normocephalic and atraumatic.   Cardiovascular:  Normal rate, regular rhythm and normal heart sounds.     Exam reveals no gallop and no friction rub.       No murmur heard.  Pulmonary/Chest: Breath sounds normal. No respiratory distress. He has no wheezes. He has no rhonchi. He has no rales.   Musculoskeletal:         General: Normal range of motion.     Neurological: He is alert and oriented to person, place, and time.   Skin: Skin is warm and dry.   Psychiatric: He has a normal mood and affect. His behavior is normal.         ED Course   Procedures  Labs Reviewed - No data to display       Imaging Results              X-Ray Chest PA And Lateral (In process)                      Medications - No data to display  Medical Decision Making  Pt is a 28 y/o male who presents  with cough for the past few months. States that he has seen many people about the cough including PCP and urgent care and can't find the cause of it. Notes that the cough is making him fail his PFT at work. Has been on zithromax, doxy, albuterol, symbicort, steroids without relief. Notes that the cough makes his ribs hurt and feels like he's fractured a rib. Of note, pt is currently on trizepatide for weight loss and recently gave up alcohol in an attempt to lose weight.     Pt morbidly obese. CXR clear without evidence of pneumonia or other abnormality. Discussed with pt chronic cough and failed PFT likely related to body habitus. Discussed lifestyle modifications. Pt has albuterol inhaler and symbicort inhaler at home. Strict ED precautions given. Pt expressed understanding and was in agreement with the plan.     Amount and/or Complexity of Data Reviewed  Independent Historian: parent     Details: Pt's mother was present throughout the visit and helped to provide the history.  Radiology: ordered. Decision-making details documented in ED Course.      Additional MDM:   Differential Diagnosis:   Other: The following diagnoses were also considered and will be evaluated: pneumonia, bronchitis and viral uri.                                   Clinical Impression:  Final diagnoses:  [R05.3] Chronic cough          ED Disposition Condition    Discharge Stable          ED Prescriptions       Medication Sig Dispense Start Date End Date Auth. Provider    dextromethorphan-guaiFENesin  mg/5 ml (ROBITUSSIN-DM)  mg/5 mL liquid Take 5 mLs by mouth 4 (four) times daily as needed (cough). 120 mL 4/14/2025 4/24/2025 Stacia Radford PA          Follow-up Information       Follow up With Specialties Details Why Contact Info    Candie Knight, FNP-C Family Medicine Go in 3 days  [address]  [address]  [facility]  [facility] [address]  [phone]                 [1]   Social History  Tobacco Use    Smoking  status: Never     Passive exposure: Never    Smokeless tobacco: Never   Substance Use Topics    Alcohol use: Yes     Alcohol/week: 24.0 standard drinks of alcohol     Types: 24 Cans of beer per week     Comment: ON WEEKENDS    Drug use: Never        Stacia Radford PA  04/14/25 2032

## 2025-04-30 ENCOUNTER — TELEPHONE (OUTPATIENT)
Dept: FAMILY MEDICINE | Facility: CLINIC | Age: 30
End: 2025-04-30

## 2025-04-30 ENCOUNTER — OFFICE VISIT (OUTPATIENT)
Dept: FAMILY MEDICINE | Facility: CLINIC | Age: 30
End: 2025-04-30
Payer: COMMERCIAL

## 2025-04-30 VITALS
HEIGHT: 66 IN | SYSTOLIC BLOOD PRESSURE: 122 MMHG | OXYGEN SATURATION: 96 % | BODY MASS INDEX: 50.62 KG/M2 | HEART RATE: 93 BPM | DIASTOLIC BLOOD PRESSURE: 82 MMHG | WEIGHT: 315 LBS | TEMPERATURE: 98 F

## 2025-04-30 DIAGNOSIS — E66.813 CLASS 3 SEVERE OBESITY DUE TO EXCESS CALORIES WITHOUT SERIOUS COMORBIDITY WITH BODY MASS INDEX (BMI) OF 60.0 TO 69.9 IN ADULT: Primary | ICD-10-CM

## 2025-04-30 DIAGNOSIS — I10 HYPERTENSION, UNSPECIFIED TYPE: ICD-10-CM

## 2025-04-30 DIAGNOSIS — R60.9 EDEMA, UNSPECIFIED TYPE: ICD-10-CM

## 2025-04-30 DIAGNOSIS — E66.01 CLASS 3 SEVERE OBESITY DUE TO EXCESS CALORIES WITHOUT SERIOUS COMORBIDITY WITH BODY MASS INDEX (BMI) OF 60.0 TO 69.9 IN ADULT: Primary | ICD-10-CM

## 2025-04-30 PROBLEM — A41.9 SEPSIS: Chronic | Status: RESOLVED | Noted: 2025-04-16 | Resolved: 2025-04-30

## 2025-04-30 LAB
ALBUMIN SERPL-MCNC: 4.5 G/DL (ref 3.4–5)
ALBUMIN/GLOB SERPL: 1.5 RATIO
ALP SERPL-CCNC: 54 UNIT/L (ref 50–144)
ALT SERPL-CCNC: 24 UNIT/L (ref 1–45)
ANION GAP SERPL CALC-SCNC: 7 MEQ/L (ref 2–13)
AST SERPL-CCNC: 27 UNIT/L (ref 17–59)
BASOPHILS # BLD AUTO: 0.06 X10(3)/MCL (ref 0.01–0.08)
BASOPHILS NFR BLD AUTO: 0.6 % (ref 0.1–1.2)
BILIRUB SERPL-MCNC: 0.5 MG/DL (ref 0–1)
BUN SERPL-MCNC: 15 MG/DL (ref 7–20)
CALCIUM SERPL-MCNC: 9.3 MG/DL (ref 8.4–10.2)
CHLORIDE SERPL-SCNC: 103 MMOL/L (ref 98–110)
CO2 SERPL-SCNC: 29 MMOL/L (ref 21–32)
CREAT SERPL-MCNC: 0.87 MG/DL (ref 0.66–1.25)
CREAT/UREA NIT SERPL: 17 (ref 12–20)
EOSINOPHIL # BLD AUTO: 0.24 X10(3)/MCL (ref 0.04–0.54)
EOSINOPHIL NFR BLD AUTO: 2.4 % (ref 0.7–7)
ERYTHROCYTE [DISTWIDTH] IN BLOOD BY AUTOMATED COUNT: 13.5 %
EST. AVERAGE GLUCOSE BLD GHB EST-MCNC: 111.2 MG/DL (ref 70–115)
GFR SERPLBLD CREATININE-BSD FMLA CKD-EPI: >90 ML/MIN/1.73/M2
GLOBULIN SER-MCNC: 3 GM/DL (ref 2–3.9)
GLUCOSE SERPL-MCNC: 84 MG/DL (ref 70–115)
HBA1C MFR BLD: 5.5 % (ref 4–6)
HCT VFR BLD AUTO: 46.5 % (ref 36–52)
HGB BLD-MCNC: 15.4 G/DL (ref 13–18)
IMM GRANULOCYTES # BLD AUTO: 0.04 X10(3)/MCL (ref 0–0.03)
IMM GRANULOCYTES NFR BLD AUTO: 0.4 % (ref 0–0.5)
LYMPHOCYTES # BLD AUTO: 1.2 X10(3)/MCL (ref 1.32–3.57)
LYMPHOCYTES NFR BLD AUTO: 11.8 % (ref 20–55)
MCH RBC QN AUTO: 27.1 PG (ref 27–34)
MCHC RBC AUTO-ENTMCNC: 33.1 G/DL (ref 31–37)
MCV RBC AUTO: 81.9 FL (ref 79–99)
MONOCYTES # BLD AUTO: 0.85 X10(3)/MCL (ref 0.3–0.82)
MONOCYTES NFR BLD AUTO: 8.4 % (ref 4.7–12.5)
NEUTROPHILS # BLD AUTO: 7.77 X10(3)/MCL (ref 1.78–5.38)
NEUTROPHILS NFR BLD AUTO: 76.4 % (ref 37–73)
NRBC BLD AUTO-RTO: 0 %
PLATELET # BLD AUTO: 320 X10(3)/MCL (ref 140–371)
PMV BLD AUTO: 9.6 FL (ref 9.4–12.4)
POTASSIUM SERPL-SCNC: 4.2 MMOL/L (ref 3.5–5.1)
PROT SERPL-MCNC: 7.5 GM/DL (ref 6.3–8.2)
RBC # BLD AUTO: 5.68 X10(6)/MCL (ref 4–6)
SODIUM SERPL-SCNC: 139 MMOL/L (ref 136–145)
TSH SERPL-ACNC: 1.34 UIU/ML (ref 0.36–3.74)
WBC # BLD AUTO: 10.16 X10(3)/MCL (ref 4–11.5)

## 2025-04-30 PROCEDURE — 1160F RVW MEDS BY RX/DR IN RCRD: CPT | Mod: CPTII,,, | Performed by: NURSE PRACTITIONER

## 2025-04-30 PROCEDURE — 3079F DIAST BP 80-89 MM HG: CPT | Mod: CPTII,,, | Performed by: NURSE PRACTITIONER

## 2025-04-30 PROCEDURE — 84443 ASSAY THYROID STIM HORMONE: CPT | Performed by: NURSE PRACTITIONER

## 2025-04-30 PROCEDURE — 83036 HEMOGLOBIN GLYCOSYLATED A1C: CPT | Performed by: NURSE PRACTITIONER

## 2025-04-30 PROCEDURE — 3008F BODY MASS INDEX DOCD: CPT | Mod: CPTII,,, | Performed by: NURSE PRACTITIONER

## 2025-04-30 PROCEDURE — 80053 COMPREHEN METABOLIC PANEL: CPT | Performed by: NURSE PRACTITIONER

## 2025-04-30 PROCEDURE — 3044F HG A1C LEVEL LT 7.0%: CPT | Mod: CPTII,,, | Performed by: NURSE PRACTITIONER

## 2025-04-30 PROCEDURE — 4010F ACE/ARB THERAPY RXD/TAKEN: CPT | Mod: CPTII,,, | Performed by: NURSE PRACTITIONER

## 2025-04-30 PROCEDURE — G2211 COMPLEX E/M VISIT ADD ON: HCPCS | Mod: ,,, | Performed by: NURSE PRACTITIONER

## 2025-04-30 PROCEDURE — 99215 OFFICE O/P EST HI 40 MIN: CPT | Mod: ,,, | Performed by: NURSE PRACTITIONER

## 2025-04-30 PROCEDURE — 3074F SYST BP LT 130 MM HG: CPT | Mod: CPTII,,, | Performed by: NURSE PRACTITIONER

## 2025-04-30 PROCEDURE — 1159F MED LIST DOCD IN RCRD: CPT | Mod: CPTII,,, | Performed by: NURSE PRACTITIONER

## 2025-04-30 PROCEDURE — 85025 COMPLETE CBC W/AUTO DIFF WBC: CPT | Performed by: NURSE PRACTITIONER

## 2025-04-30 RX ORDER — TIRZEPATIDE 12.5 MG/.5ML
12.5 INJECTION, SOLUTION SUBCUTANEOUS
Qty: 12 PEN | Refills: 1 | Status: SHIPPED | OUTPATIENT
Start: 2025-04-30

## 2025-04-30 NOTE — PROGRESS NOTES
Patient ID: [id number]     Chief Complaint: Shortness of Breath      Subjective     Ramos Knight is a 29 y.o. male in the office for Shortness of Breath      History of Present Illness    CHIEF COMPLAINT:  Patient presents today for follow up after recent hospitalization.    HISTORY OF PRESENT ILLNESS:  He failed pulmonary function testing twice due to severe coughing. After being diagnosed with chronic cough at urgent care, he had two emergency room visits. The first ER visit included a chest XR which showed no abnormalities. During the second ER visit at [facility], he presented with difficulty breathing. He was diagnosed with pneumonia, left pleural effusion, pericardial effusion, rhinovirus, and diastolic heart failure. He reports complete resolution of cough with no current episodes of coughing or sputum production.    MEDICATIONS:  He completed a 5-day course of antibiotics.    SLEEP:  He uses a borrowed CPAP machine for sleep.    Cardiology: Scheduled last month but was rescheduled by [facility].  He's rescheduled for 2 weeks from now but because he was fired for not passing PFT, he'll be without insurance after today.  He's working on getting another job.       ROS:  ROS as indicated in HPI.         Past Medical History:  has a past medical history of Broken ankle, CHF (congestive heart failure), Hypertension, and Sepsis.    Social History:  reports that he has never smoked. He has never been exposed to tobacco smoke. He has never used smokeless tobacco. He reports current alcohol use of about 24.0 standard drinks of alcohol per week. He reports that he does not use drugs.    Current Outpatient Medications   Medication Instructions    albuterol (PROVENTIL HFA) 90 mcg/actuation inhaler 2 puffs, Inhalation, Every 6 hours PRN, Rescue    budesonide-formoterol 80-4.5 mcg (SYMBICORT) 80-4.5 mcg/actuation HFAA 2 puffs, Inhalation, 2 times daily, Controller    FLUoxetine 20 mg, Oral, Daily    hydroCHLOROthiazide 12.5 mg,  "Oral, Daily    labetaloL (NORMODYNE) 100 mg, Oral, 2 times daily    SURE COMFORT PEN NEEDLE 32 gauge x 5/32" Ndle 1 each, Daily    valsartan (DIOVAN) 320 mg, Oral, Daily    ZEPBOUND 12.5 mg, Subcutaneous, Every 7 days       Patient is allergic to lisinopril.     Patient Care Team:  Candie Knight FNP-C as PCP - General (Family Medicine)  Rober Love MD as Consulting Physician (Cardiology)     Objective     Visit Vitals  /82 (BP Location: Left arm, Patient Position: Sitting)   Pulse 93   Temp 98.2 °F (36.8 °C) (Temporal)   Ht 5' 5.98" (1.676 m)   Wt (!) 183.3 kg (404 lb)   SpO2 96%   BMI 65.24 kg/m²       Physical Exam    Assessment & Plan     1. Class 3 severe obesity due to excess calories without serious comorbidity with body mass index (BMI) of 60.0 to 69.9 in adult  Overview:  Cleared up problem at the pharmacy.  They have ordered his Zepbound 7.5 and should be in tomorrow.  He was encouraged to stay on this medication for best results.  I'd like him to follow up every 6-8 weeks to monitor his results.     Orders:  -     tirzepatide, weight loss, (ZEPBOUND) 12.5 mg/0.5 mL PnIj; Inject 12.5 mg into the skin every 7 days.  Dispense: 12 Pen; Refill: 1  -     CBC Auto Differential; Future; Expected date: 04/30/2025  -     Comprehensive Metabolic Panel; Future; Expected date: 04/30/2025  -     Hemoglobin A1C; Future; Expected date: 04/30/2025  -     TSH; Future; Expected date: 04/30/2025    2. Hypertension, unspecified type  Overview:  On hctz 12.5 mg daily, valsartan 320 mg daily, labetalol 100 mg bid    Orders:  -     CBC Auto Differential; Future; Expected date: 04/30/2025  -     Comprehensive Metabolic Panel; Future; Expected date: 04/30/2025  -     Hemoglobin A1C; Future; Expected date: 04/30/2025  -     TSH; Future; Expected date: 04/30/2025    3. Edema, unspecified type         Assessment & Plan    E66.813, E66.01, Z68.44 Class 3 severe obesity due to excess calories without serious comorbidity " with body mass index (BMI) of 60.0 to 69.9 in adult  I10 Hypertension, unspecified type  R60.9 Edema, unspecified type    IMPRESSION:  - Reviewed recent hospitalization for pneumonia, fluid in left lung, fluid around heart, rhinovirus, and diastolic heart failure.  - Noted improvement in condition since completing antibiotics, with resolution of cough.  - Assessed current medication regimen, including HCTZ for fluid management.  - Evaluated effectiveness of Zepbound for weight management and increased from 10 mg to 12.5 mg (3 month supply) since he'll be without insurance until he finds a new job.  - Reviewed echocardiogram report from recent hospital stay, noting normal systolic function with EF 60-65%, mildly increased left ventricle wall thickness, and mildly increased right ventricle cavity size.  - Considered need for further cardiac evaluation ([facility] or [facility]) to characterize potential atrial septal defect, but unable to pursue due to loss of insurance.  - Plan to obtain and review recent CT and US results from hospital to bridge gap in care until insurance is regained and cardiologist can be seen.    E66.813, E66.01, Z68.44 CLASS 3 SEVERE OBESITY DUE TO EXCESS CALORIES WITHOUT SERIOUS COMORBIDITY WITH BODY MASS INDEX (BMI) OF 60.0 TO 69.9 IN ADULT:  - Evaluated effectiveness of Zepbound for weight management and increased dosage from 10 mg to 12.5 mg.  - Patient to follow up today to  new Zepbound prescription.          Follow up for 1 mo. In addition to their next scheduled appointment, the patient has also been instructed to follow up on as needed basis.     Future Appointments   Date Time Provider Department Center   6/25/2025  1:00 PM Candie Knight, FNP-C [facility] I spent a total of 57 minutes on the day of the visit.  This includes face to face time and non-face to face time preparing to see the patient (eg, review of tests), obtaining and/or reviewing separately obtained  history, documenting clinical information in the electronic or other health record, independently interpreting results and communicating results to the patient/family/caregiver, or care coordinator. Reviewed chart with Dr. Perkins.       This note was generated with the assistance of ambient listening technology. Verbal consent was obtained by the patient and accompanying visitor(s) for the recording of patient appointment to facilitate this note. I attest to having reviewed and edited the generated note for accuracy, though some syntax or spelling errors may persist. Please contact the author of this note for any clarification.

## 2025-05-13 ENCOUNTER — TELEPHONE (OUTPATIENT)
Dept: FAMILY MEDICINE | Facility: CLINIC | Age: 30
End: 2025-05-13
Payer: COMMERCIAL

## 2025-05-20 ENCOUNTER — OFFICE VISIT (OUTPATIENT)
Dept: FAMILY MEDICINE | Facility: CLINIC | Age: 30
End: 2025-05-20
Payer: COMMERCIAL

## 2025-05-20 VITALS
TEMPERATURE: 97 F | SYSTOLIC BLOOD PRESSURE: 132 MMHG | OXYGEN SATURATION: 95 % | BODY MASS INDEX: 50.62 KG/M2 | DIASTOLIC BLOOD PRESSURE: 82 MMHG | HEART RATE: 82 BPM | HEIGHT: 66 IN | WEIGHT: 315 LBS

## 2025-05-20 DIAGNOSIS — G47.33 SEVERE OBSTRUCTIVE SLEEP APNEA: ICD-10-CM

## 2025-05-20 DIAGNOSIS — K21.9 GASTROESOPHAGEAL REFLUX DISEASE, UNSPECIFIED WHETHER ESOPHAGITIS PRESENT: ICD-10-CM

## 2025-05-20 DIAGNOSIS — R06.02 SOB (SHORTNESS OF BREATH): ICD-10-CM

## 2025-05-20 DIAGNOSIS — I10 PRIMARY HYPERTENSION: Primary | ICD-10-CM

## 2025-05-20 DIAGNOSIS — E66.813 CLASS 3 SEVERE OBESITY DUE TO EXCESS CALORIES WITHOUT SERIOUS COMORBIDITY WITH BODY MASS INDEX (BMI) OF 60.0 TO 69.9 IN ADULT: ICD-10-CM

## 2025-05-20 DIAGNOSIS — R60.0 PERIPHERAL EDEMA: ICD-10-CM

## 2025-05-20 DIAGNOSIS — E66.01 CLASS 3 SEVERE OBESITY DUE TO EXCESS CALORIES WITHOUT SERIOUS COMORBIDITY WITH BODY MASS INDEX (BMI) OF 60.0 TO 69.9 IN ADULT: ICD-10-CM

## 2025-05-20 PROCEDURE — 4010F ACE/ARB THERAPY RXD/TAKEN: CPT | Mod: CPTII,,, | Performed by: NURSE PRACTITIONER

## 2025-05-20 PROCEDURE — G2211 COMPLEX E/M VISIT ADD ON: HCPCS | Mod: ,,, | Performed by: NURSE PRACTITIONER

## 2025-05-20 PROCEDURE — 3008F BODY MASS INDEX DOCD: CPT | Mod: CPTII,,, | Performed by: NURSE PRACTITIONER

## 2025-05-20 PROCEDURE — 1159F MED LIST DOCD IN RCRD: CPT | Mod: CPTII,,, | Performed by: NURSE PRACTITIONER

## 2025-05-20 PROCEDURE — 3044F HG A1C LEVEL LT 7.0%: CPT | Mod: CPTII,,, | Performed by: NURSE PRACTITIONER

## 2025-05-20 PROCEDURE — 99213 OFFICE O/P EST LOW 20 MIN: CPT | Mod: ,,, | Performed by: NURSE PRACTITIONER

## 2025-05-20 PROCEDURE — 3079F DIAST BP 80-89 MM HG: CPT | Mod: CPTII,,, | Performed by: NURSE PRACTITIONER

## 2025-05-20 PROCEDURE — 3075F SYST BP GE 130 - 139MM HG: CPT | Mod: CPTII,,, | Performed by: NURSE PRACTITIONER

## 2025-05-20 PROCEDURE — 1160F RVW MEDS BY RX/DR IN RCRD: CPT | Mod: CPTII,,, | Performed by: NURSE PRACTITIONER

## 2025-05-20 RX ORDER — PANTOPRAZOLE SODIUM 40 MG/1
40 TABLET, DELAYED RELEASE ORAL DAILY
Qty: 90 TABLET | Refills: 0 | Status: SHIPPED | OUTPATIENT
Start: 2025-05-20

## 2025-05-20 NOTE — PROGRESS NOTES
"Patient ID: [id number]     Chief Complaint: paperwork      Subjective     Ramos Knight is a 30 y.o. male in the office for paperwork      History of Present Illness    CHIEF COMPLAINT:  Patient presents today for paperwork completion for return to work and short-term disability    WEIGHT MANAGEMENT:  He reports weight gain of 9 lbs since last visit, with previous measurements of 421 lbs in February and 404 lbs on April 30th. He acknowledges continued overeating.    RESPIRATORY STATUS:  He reports improved breathing and denies current cough. Pulmonary function test was recently completed.    CARDIOVASCULAR:  He attended a cardiology appointment last week. He reports reflux symptoms which he attributes to recent Losartan dosage increase.    SOCIAL HISTORY:  He generally abstains from alcohol but consumed a few alcoholic beverages last weekend.      ROS:  ROS as indicated in HPI.         Past Medical History:  has a past medical history of Broken ankle, CHF (congestive heart failure), Hypertension, and Sepsis.    Social History:  reports that he has never smoked. He has never been exposed to tobacco smoke. He has never used smokeless tobacco. He reports current alcohol use of about 24.0 standard drinks of alcohol per week. He reports that he does not use drugs.    Current Outpatient Medications   Medication Instructions    albuterol (PROVENTIL HFA) 90 mcg/actuation inhaler 2 puffs, Inhalation, Every 6 hours PRN, Rescue    budesonide-formoterol 80-4.5 mcg (SYMBICORT) 80-4.5 mcg/actuation HFAA 2 puffs, Inhalation, 2 times daily, Controller    FLUoxetine 20 mg, Oral, Daily    hydroCHLOROthiazide 12.5 mg, Oral, Daily    labetaloL (NORMODYNE) 100 mg, Oral, 2 times daily    pantoprazole (PROTONIX) 40 mg, Oral, Daily    SURE COMFORT PEN NEEDLE 32 gauge x 5/32" Ndle 1 each, Daily    valsartan (DIOVAN) 320 mg, Oral, Daily    ZEPBOUND 12.5 mg, Subcutaneous, Every 7 days       Patient is allergic to lisinopril.     Patient Care " "Team:  Candie Knight FNP-C as PCP - General (Family Medicine)  Rober Love MD as Consulting Physician (Cardiology)     Objective     Visit Vitals  /82 (BP Location: Left arm)   Pulse 82   Temp 97 °F (36.1 °C) (Temporal)   Ht 5' 5.58" (1.666 m)   Wt (!) 187.3 kg (413 lb)   SpO2 95%   BMI 67.52 kg/m²       Physical Exam  Vitals reviewed.   Constitutional:       General: He is not in acute distress.  Cardiovascular:      Rate and Rhythm: Normal rate and regular rhythm.   Pulmonary:      Effort: Respiratory distress present.      Breath sounds: Normal breath sounds.   Musculoskeletal:      Right lower leg: Edema present.      Left lower leg: Edema present.   Skin:     General: Skin is warm and dry.      Capillary Refill: Capillary refill takes less than 2 seconds.   Neurological:      Mental Status: He is alert and oriented to person, place, and time.   Psychiatric:         Mood and Affect: Mood normal.         Behavior: Behavior normal.         Assessment & Plan     1. Primary hypertension  Overview:  On hctz 12.5 mg daily, valsartan 320 mg daily, labetalol 100 mg bid    Assessment & Plan:  Well controlled.   Continue current meds  Low Sodium Diet (DASH Diet - Less than 2 grams of sodium per day).  Monitor blood pressure daily and log. Report consistent numbers greater than 140/90.  Maintain healthy weight with goal BMI <30. Exercise 30 minutes per day, 5 days per week.  Smoking cessation encouraged to aid in BP reduction.        2. Peripheral edema    3. Class 3 severe obesity due to excess calories without serious comorbidity with body mass index (BMI) of 60.0 to 69.9 in adult  Overview:  On Mounjaro 12.5 mg weekly    Assessment & Plan:  Continue zepbound 12.5 mg      4. Severe obstructive sleep apnea    5. SOB (shortness of breath)    6. Gastroesophageal reflux disease, unspecified whether esophagitis present  Assessment & Plan:  Start protonix.  F/u 3 months.    Orders:  -     pantoprazole (PROTONIX) " 40 MG tablet; Take 1 tablet (40 mg total) by mouth once daily.  Dispense: 90 tablet; Refill: 0         Assessment & Plan    I10 Primary hypertension  R60.0 Peripheral edema  E66.813, E66.01, Z68.44 Class 3 severe obesity due to excess calories without serious comorbidity with body mass index (BMI) of 60.0 to 69.9 in adult  G47.33 Severe obstructive sleep apnea  R06.02 SOB (shortness of breath)  K21.9 Gastroesophageal reflux disease, unspecified whether esophagitis present    IMPRESSION:  - Assessed readiness to return to work after recent health issues.  - Evaluated weight gain of 9 lbs since last visit, ruling out fluid retention as cause.  - Considered pulmonary function test results from recent job application.  - Reviewed cardiac status based on recent cardiology visit.  - Addressed reflux symptoms, potentially related to increased Zepbound dose.  - Noted weight loss progress, despite recent fluctuation.  - Cleared medically to return to work.    I10 PRIMARY HYPERTENSION:  - Prescribed Losartan at current dose.  - Follow up with cardiologist as directed.    K21.9 GASTROESOPHAGEAL REFLUX DISEASE, UNSPECIFIED WHETHER ESOPHAGITIS PRESENT:  - Prescribed Protonix for heartburn to allow continued use of Losartan at current dose.          No follow-ups on file. In addition to their next scheduled appointment, the patient has also been instructed to follow up on as needed basis.     Future Appointments   Date Time Provider Department Center   9/25/2025  2:30 PM Candie Knight, [facility]      This note was generated with the assistance of ambient listening technology. Verbal consent was obtained by the patient and accompanying visitor(s) for the recording of patient appointment to facilitate this note. I attest to having reviewed and edited the generated note for accuracy, though some syntax or spelling errors may persist. Please contact the author of this note for any clarification.

## 2025-08-19 ENCOUNTER — OFFICE VISIT (OUTPATIENT)
Dept: FAMILY MEDICINE | Facility: CLINIC | Age: 30
End: 2025-08-19

## 2025-08-19 VITALS
BODY MASS INDEX: 50.62 KG/M2 | OXYGEN SATURATION: 95 % | HEIGHT: 66 IN | TEMPERATURE: 98 F | WEIGHT: 315 LBS | DIASTOLIC BLOOD PRESSURE: 84 MMHG | SYSTOLIC BLOOD PRESSURE: 138 MMHG | HEART RATE: 73 BPM

## 2025-08-19 DIAGNOSIS — F41.9 ANXIETY: ICD-10-CM

## 2025-08-19 DIAGNOSIS — G47.33 SEVERE OBSTRUCTIVE SLEEP APNEA: ICD-10-CM

## 2025-08-19 DIAGNOSIS — R09.81 NASAL CONGESTION: ICD-10-CM

## 2025-08-19 DIAGNOSIS — H66.002 NON-RECURRENT ACUTE SUPPURATIVE OTITIS MEDIA OF LEFT EAR WITHOUT SPONTANEOUS RUPTURE OF TYMPANIC MEMBRANE: Primary | ICD-10-CM

## 2025-08-19 DIAGNOSIS — R05.1 ACUTE COUGH: ICD-10-CM

## 2025-08-19 PROCEDURE — 99214 OFFICE O/P EST MOD 30 MIN: CPT | Mod: ,,, | Performed by: NURSE PRACTITIONER

## 2025-08-19 RX ORDER — BUSPIRONE HYDROCHLORIDE 5 MG/1
5 TABLET ORAL 2 TIMES DAILY
Qty: 60 TABLET | Refills: 2 | Status: SHIPPED | OUTPATIENT
Start: 2025-08-19 | End: 2026-08-19

## 2025-08-19 RX ORDER — AMOXICILLIN AND CLAVULANATE POTASSIUM 875; 125 MG/1; MG/1
1 TABLET, FILM COATED ORAL 2 TIMES DAILY
Qty: 20 TABLET | Refills: 0 | Status: SHIPPED | OUTPATIENT
Start: 2025-08-19 | End: 2025-08-29

## 2025-08-19 RX ORDER — HYDROCHLOROTHIAZIDE 12.5 MG/1
12.5 CAPSULE ORAL DAILY
COMMUNITY
Start: 2025-06-23

## 2025-08-19 RX ORDER — FLUTICASONE PROPIONATE 50 MCG
1 SPRAY, SUSPENSION (ML) NASAL DAILY
Qty: 15 ML | Refills: 0 | Status: SHIPPED | OUTPATIENT
Start: 2025-08-19 | End: 2025-08-26

## 2025-09-01 ENCOUNTER — HOSPITAL ENCOUNTER (EMERGENCY)
Facility: HOSPITAL | Age: 30
Discharge: HOME | End: 2025-09-01

## 2025-09-01 VITALS
DIASTOLIC BLOOD PRESSURE: 100 MMHG | HEART RATE: 83 BPM | SYSTOLIC BLOOD PRESSURE: 169 MMHG | HEIGHT: 66 IN | RESPIRATION RATE: 18 BRPM | TEMPERATURE: 99 F | OXYGEN SATURATION: 98 % | WEIGHT: 315 LBS | BODY MASS INDEX: 50.62 KG/M2

## 2025-09-01 DIAGNOSIS — L03.012 CELLULITIS OF THUMB, LEFT: Primary | ICD-10-CM

## 2025-09-01 PROCEDURE — 90471 IMMUNIZATION ADMIN: CPT | Performed by: NURSE PRACTITIONER

## 2025-09-01 PROCEDURE — 99284 EMERGENCY DEPT VISIT MOD MDM: CPT | Mod: 25

## 2025-09-01 PROCEDURE — 90715 TDAP VACCINE 7 YRS/> IM: CPT | Performed by: NURSE PRACTITIONER

## 2025-09-01 PROCEDURE — 63600175 PHARM REV CODE 636 W HCPCS: Performed by: NURSE PRACTITIONER

## 2025-09-01 RX ORDER — AMOXICILLIN AND CLAVULANATE POTASSIUM 875; 125 MG/1; MG/1
1 TABLET, FILM COATED ORAL 2 TIMES DAILY
Qty: 14 TABLET | Refills: 0 | Status: SHIPPED | OUTPATIENT
Start: 2025-09-01 | End: 2025-09-08

## 2025-09-01 RX ADMIN — CLOSTRIDIUM TETANI TOXOID ANTIGEN (FORMALDEHYDE INACTIVATED), CORYNEBACTERIUM DIPHTHERIAE TOXOID ANTIGEN (FORMALDEHYDE INACTIVATED), BORDETELLA PERTUSSIS TOXOID ANTIGEN (GLUTARALDEHYDE INACTIVATED), BORDETELLA PERTUSSIS FILAMENTOUS HEMAGGLUTININ ANTIGEN (FORMALDEHYDE INACTIVATED), BORDETELLA PERTUSSIS PERTACTIN ANTIGEN, AND BORDETELLA PERTUSSIS FIMBRIAE 2/3 ANTIGEN 0.5 ML: 5; 2; 2.5; 5; 3; 5 INJECTION, SUSPENSION INTRAMUSCULAR at 09:09
